# Patient Record
Sex: FEMALE | Race: WHITE | NOT HISPANIC OR LATINO | Employment: UNEMPLOYED | URBAN - METROPOLITAN AREA
[De-identification: names, ages, dates, MRNs, and addresses within clinical notes are randomized per-mention and may not be internally consistent; named-entity substitution may affect disease eponyms.]

---

## 2016-06-21 LAB
EXTERNAL ABO GROUPING: NORMAL
EXTERNAL ANTIBODY SCREEN: NEGATIVE
EXTERNAL CHLAMYDIA SCREEN: NEGATIVE
EXTERNAL GONORRHEA SCREEN: NEGATIVE
EXTERNAL HEMATOCRIT: 36 %
EXTERNAL HEMOGLOBIN: 11.1 G/DL
EXTERNAL HEPATITIS B SURFACE ANTIGEN: NEGATIVE
EXTERNAL HEPATITIS C AB: NEGATIVE
EXTERNAL RH FACTOR: POSITIVE
EXTERNAL RUBELLA QUALITATIVE: NORMAL
EXTERNAL URINE DRUG SCREEN: POSITIVE
EXTERNAL VDRL: NEGATIVE
HIV1 AB SPEC QL IA.RAPID: NEGATIVE

## 2017-01-17 ENCOUNTER — HOSPITAL ENCOUNTER (OUTPATIENT)
Facility: HOSPITAL | Age: 27
Discharge: HOME | End: 2017-01-17
Attending: OBSTETRICS & GYNECOLOGY | Admitting: OBSTETRICS & GYNECOLOGY

## 2017-01-17 VITALS
WEIGHT: 150 LBS | DIASTOLIC BLOOD PRESSURE: 63 MMHG | RESPIRATION RATE: 20 BRPM | SYSTOLIC BLOOD PRESSURE: 114 MMHG | HEIGHT: 64 IN | HEART RATE: 118 BPM | BODY MASS INDEX: 25.61 KG/M2 | TEMPERATURE: 98.8 F

## 2017-01-17 PROBLEM — Z34.90 PREGNANCY: Status: ACTIVE | Noted: 2017-01-17

## 2017-01-17 RX ORDER — PSEUDOEPHEDRINE HCL 30 MG
30 TABLET ORAL EVERY 4 HOURS PRN
COMMUNITY
End: 2017-01-21 | Stop reason: HOSPADM

## 2017-01-17 RX ORDER — ACETAMINOPHEN 500 MG
500 TABLET ORAL EVERY 6 HOURS PRN
COMMUNITY
End: 2017-01-21 | Stop reason: HOSPADM

## 2017-01-17 NOTE — IP AVS SNAPSHOT
AFTER VISIT SUMMARY             Caroline Sow           About your hospitalization     You were discharged on:  January 17, 2017 You last received care in the:  [facility]     Unit phone number:  [phone]       Medications    If you or your caregiver advised us that you are currently taking a medication and that medication is marked below as “Resume”, this simply indicates that we have reviewed those medications to make sure our new therapy recommendations do not interfere.  If you have concerns about medications other than those new ones which we are prescribing today, please consult the physician who prescribed them (or your primary physician).  Our review of your home medications is not meant to indicate that we are directing their use.             Your Medications      ASK your doctor about these medications           Morning Noon Evening Bedtime As Needed    acetaminophen 500 MG tablet   Take 500 mg by mouth Every 6 (Six) Hours As Needed for mild pain (1-3).   Commonly known as:  TYLENOL                                pseudoephedrine 30 MG tablet   Take 30 mg by mouth Every 4 (Four) Hours As Needed for congestion.   Commonly known as:  SUDAFED                                         Instructions for After Discharge         Relevant Prenatal Information          Facts About Your Prenatal Visit (All Dating Information Is Approximate)     Due Date How Far Along Am I?          2/1/2017 37 weeks 6 days        Vitals     Blood Pressure Weight                114/63 150 lb (68 kg)           Summary of Your Hospitalization        Reason for Hospitalization     Your primary diagnosis was:  Not on File      Care Providers     Provider Service Role Specialty    Kevin Holloway MD -- Attending Provider Obstetrics and Gynecology      Your Allergies  Date Reviewed: 1/17/2017    Allergen Reactions    Penicillins Rash      MyChart Signup     [facility] MyChart allows you to send  messages to your doctor, view your test results, renew your prescriptions, schedule appointments, and more. To sign up, go to [email or web address] and click on the Sign Up Now link in the New User? box. Enter your [employer] Activation Code exactly as it appears below along with the last four digits of your Social Security Number and your Date of Birth () to complete the sign-up process. If you do not sign up before the expiration date, you must request a new code.    Z[employer] Activation Code: [email or web address]  Expires: 2017  7:22 PM    If you have questions, you can email [email or web address] or call [phone] to talk to our [employer] staff. Remember, [employer] is NOT to be used for urgent needs. For medical emergencies, dial 911.          Information Regarding Fetal Kick Counts     Fetal Movement Counts  Patient Name: __________________________________________________   Patient Due Date: ____________________  Performing a fetal movement count is highly recommended in high-risk pregnancies, but it is good for every pregnant woman to do. Your caregiver may ask you to start counting fetal movements at 28 weeks of the pregnancy. Fetal movements often increase:  · After eating a full meal.  · After physical activity.  · After eating or drinking something sweet or cold.  · At rest.  Pay attention to when you feel the baby is most active. This will help you notice a pattern of your baby's sleep and wake cycles and what factors contribute to an increase in fetal movement. It is important to perform a fetal movement count at the same time each day when your baby is normally most active.    HOW TO COUNT FETAL MOVEMENTS  1. Find a quiet and comfortable area to sit or lie down on your left side. Lying on your left side provides the best blood and oxygen circulation to your baby.  2. Write down the day and time on a sheet of paper or in a journal.  3. Start counting kicks, flutters, swishes, rolls, or jabs in  a 2 hour period. You should feel at least 10 movements within 2 hours.  4. If you do not feel 10 movements in 2 hours, wait 2-3 hours and count again. Look for a change in the pattern or not enough counts in 2 hours.  SEEK MEDICAL CARE IF:  · You feel less than 10 counts in 2 hours, tried twice.  · There is no movement in over an hour.  · The pattern is changing or taking longer each day to reach 10 counts in 2 hours.  · You feel the baby is not moving as he or she usually does.    Date  Movements  Start Time  Finish Time    Date  Movements  Start Time  Finish Time    Date  Movements  Start Time  Finish Time    Date  Movements  Start Time  Finish Time    Date  Movements  Start Time  Finish Time    Date  Movements  Start Time  Finish Time      Date  Movements  Start Time  Finish Time    Date  Movements  Start Time  Finish Time    Date  Movements  Start Time  Finish Time    Date  Movements  Start Time  Finish Time    Date  Movements  Start Time  Finish Time    Date  Movements  Start Time  Finish Time      Date  Movements  Start Time  Finish Time    Date  Movements  Start Time  Finish Time    Date  Movements  Start Time  Finish Time    Date  Movements  Start Time  Finish Time    Date  Movements  Start Time  Finish Time    Date  Movements  Start Time  Finish Time    Date  Movements  Start Time  Finish Time    Date  Movements  Start Time  Finish Time    Date  Movements  Start Time  Finish Time    Date  Movements  Start Time  Finish Time    Date  Movements  Start Time  Finish Time    Date  Movements  Start Time  Finish Time      Document Released: 01/17/2008   Document Revised: 12/04/2013   Document Reviewed: 10/14/2013    ExitCare® Patient Information ©2015 [place], [facility], [place]. This information is not intended to replace advice given to you by your health care provider. Make sure you discuss any questions you have with your health care provider.            More Information      Pradeep Butler Contractions  Contractions of the  uterus can occur throughout pregnancy. Contractions are not always a sign that you are in labor.   WHAT ARE NATALIE BUTLER CONTRACTIONS?   Contractions that occur before labor are called [place] Butler contractions, or false labor. Toward the end of pregnancy (32-34 weeks), these contractions can develop more often and may become more forceful. This is not true labor because these contractions do not result in opening (dilatation) and thinning of the cervix. They are sometimes difficult to tell apart from true labor because these contractions can be forceful and people have different pain tolerances. You should not feel embarrassed if you go to the hospital with false labor. Sometimes, the only way to tell if you are in true labor is for your health care provider to look for changes in the cervix.  If there are no prenatal problems or other health problems associated with the pregnancy, it is completely safe to be sent home with false labor and await the onset of true labor.  HOW CAN YOU TELL THE DIFFERENCE BETWEEN TRUE AND FALSE LABOR?  False Labor  · The contractions of false labor are usually shorter and not as hard as those of true labor.    · The contractions are usually irregular.    · The contractions are often felt in the front of the lower abdomen and in the groin.    · The contractions may go away when you walk around or change positions while lying down.    · The contractions get weaker and are shorter lasting as time goes on.    · The contractions do not usually become progressively stronger, regular, and closer together as with true labor.    True Labor  · Contractions in true labor last 30-70 seconds, become very regular, usually become more intense, and increase in frequency.    · The contractions do not go away with walking.    · The discomfort is usually felt in the top of the uterus and spreads to the lower abdomen and low back.    · True labor can be determined by your health care provider with an  exam. This will show that the cervix is dilating and getting thinner.    WHAT TO REMEMBER  · Keep up with your usual exercises and follow other instructions given by your health care provider.    · Take medicines as directed by your health care provider.    · Keep your regular prenatal appointments.    · Eat and drink lightly if you think you are going into labor.    · If [place] Butler contractions are making you uncomfortable:      Change your position from lying down or resting to walking, or from walking to resting.      Sit and rest in a tub of warm water.      Drink 2-3 glasses of water. Dehydration may cause these contractions.      Do slow and deep breathing several times an hour.    WHEN SHOULD I SEEK IMMEDIATE MEDICAL CARE?  Seek immediate medical care if:  · Your contractions become stronger, more regular, and closer together.    · You have fluid leaking or gushing from your vagina.    · You have a fever.    · You pass blood-tinged mucus.    · You have vaginal bleeding.    · You have continuous abdominal pain.    · You have low back pain that you never had before.    · You feel your baby's head pushing down and causing pelvic pressure.    · Your baby is not moving as much as it used to.       This information is not intended to replace advice given to you by your health care provider. Make sure you discuss any questions you have with your health care provider.     Document Released: 12/18/2006 Document Revised: 12/23/2014 Document Reviewed: 09/29/2014  [employer] Interactive Patient Education ©2016 [employer]            SYMPTOMS OF A STROKE    Call 911 or have someone take you to the Emergency Department if you have any of the following:    · Sudden numbness or weakness of your face, arm or leg especially on one side of the body  · Sudden confusion, diffiiculty speaking or trouble understanding   · Changes in your vision or loss of sight in one eye  · Sudden severe headache with no known cause  · sudden  dizziness, trouble walking, loss of balance or coordination    It is important to seek emergency care right away if you have further stroke symptoms. If you get emergency help quickly, the powerful clot-dissolving medicines can reduce the disabilities caused by a stroke.     For more information:    American Stroke Association  9-478-9-STROKE  www.strokeassociation.org           IF YOU SMOKE OR USE TOBACCO PLEASE READ THE FOLLOWING:    Why is smoking bad for me?  Smoking increases the risk of heart disease, lung disease, vascular disease, stroke, and cancer.     If you smoke, STOP!    If you would like more information on quitting smoking, please visit the [employer] website: [email or web address]   This link will provide additional resources including the QUIT line and the [employer] support groups.     For more information:    American Cancer Society  (328) 293-4365    American Heart Association  1-603.391.2729               YOU ARE THE MOST IMPORTANT FACTOR IN YOUR RECOVERY.     Follow all instructions carefully.     I have reviewed my discharge instructions with my nurse, including the following information, if applicable:     Information about my illness and diagnosis   Follow up appointments (including lab draws)   Wound Care   Equipment Needs   Medications (new and continuing) along with side effects   Preventative information such as vaccines and smoking cessations   Diet   Pain   I know when to contact my Doctor's office or seek emergency care      I want my nurse to describe the side effects of my medications: YES NO   If the answer is no, I understand the side effects of my medications: YES NO   My nurse described the side effects of my medications in a way that I could understand: YES NO   I have taken my personal belongings and my own medications with me at discharge: YES NO            Should a home visit be schedule with you:  a notification from your  provider will be made prior to the visit.  If you have any questions or concerns about the visit, contact your provider.      I have received this information and my questions have been answered. I have discussed any concerns I see with this plan with the nurse or physician. I understand these instructions.    Signature of Patient or Responsible Person: _____________________________________    Date: _________________  Time: __________________    Signature of Healthcare Provider: _______________________________________  Date: _________________  Time: __________________

## 2017-01-19 ENCOUNTER — ANESTHESIA EVENT (OUTPATIENT)
Dept: OBSTETRICS AND GYNECOLOGY | Facility: HOSPITAL | Age: 27
End: 2017-01-19

## 2017-01-19 ENCOUNTER — ANESTHESIA (OUTPATIENT)
Dept: OBSTETRICS AND GYNECOLOGY | Facility: HOSPITAL | Age: 27
End: 2017-01-19

## 2017-01-19 ENCOUNTER — HOSPITAL ENCOUNTER (INPATIENT)
Facility: HOSPITAL | Age: 27
LOS: 2 days | Discharge: HOME | End: 2017-01-21
Attending: OBSTETRICS & GYNECOLOGY | Admitting: OBSTETRICS & GYNECOLOGY

## 2017-01-19 DIAGNOSIS — Z30.2 REQUEST FOR STERILIZATION: Primary | ICD-10-CM

## 2017-01-19 PROBLEM — Z37.9 NORMAL LABOR: Status: ACTIVE | Noted: 2017-01-19

## 2017-01-19 LAB
ABO GROUP BLD: NORMAL
AMPHET+METHAMPHET UR QL: NEGATIVE
AMPHETAMINES UR QL: NEGATIVE
BARBITURATES UR QL SCN: NEGATIVE
BENZODIAZ UR QL SCN: NEGATIVE
BLD GP AB SCN SERPL QL: NEGATIVE
BUPRENORPHINE SERPL-MCNC: NEGATIVE NG/ML
CANNABINOIDS SERPL QL: NEGATIVE
COCAINE UR QL: NEGATIVE
DEPRECATED RDW RBC AUTO: 42.1 FL (ref 37–54)
ERYTHROCYTE [DISTWIDTH] IN BLOOD BY AUTOMATED COUNT: 13.3 % (ref 11.5–14.5)
HCT VFR BLD AUTO: 33.3 % (ref 37–47)
HGB BLD-MCNC: 10.5 G/DL (ref 12–16)
MCH RBC QN AUTO: 27.4 PG (ref 27–31)
MCHC RBC AUTO-ENTMCNC: 31.5 G/DL (ref 31–37)
MCV RBC AUTO: 86.9 FL (ref 81–99)
METHADONE UR QL SCN: NEGATIVE
OPIATES UR QL: NEGATIVE
OXYCODONE UR QL SCN: NEGATIVE
PCP UR QL SCN: NEGATIVE
PLATELET # BLD AUTO: 248 10*3/MM3 (ref 140–500)
PMV BLD AUTO: 11.2 FL (ref 7.4–10.4)
PROPOXYPH UR QL: NEGATIVE
RBC # BLD AUTO: 3.83 10*6/MM3 (ref 4.2–5.4)
RH BLD: POSITIVE
TRICYCLICS UR QL SCN: NEGATIVE
WBC NRBC COR # BLD: 15.02 10*3/MM3 (ref 4.8–10.8)

## 2017-01-19 PROCEDURE — 25010000002 FENTANYL CITRATE (PF) 100 MCG/2ML SOLUTION: Performed by: OBSTETRICS & GYNECOLOGY

## 2017-01-19 PROCEDURE — 86900 BLOOD TYPING SEROLOGIC ABO: CPT

## 2017-01-19 PROCEDURE — 25010000002 FENTANYL CITRATE (PF) 100 MCG/2ML SOLUTION

## 2017-01-19 PROCEDURE — 80306 DRUG TEST PRSMV INSTRMNT: CPT | Performed by: OBSTETRICS & GYNECOLOGY

## 2017-01-19 PROCEDURE — 86901 BLOOD TYPING SEROLOGIC RH(D): CPT

## 2017-01-19 PROCEDURE — 86850 RBC ANTIBODY SCREEN: CPT

## 2017-01-19 PROCEDURE — 85027 COMPLETE CBC AUTOMATED: CPT | Performed by: OBSTETRICS & GYNECOLOGY

## 2017-01-19 RX ORDER — FENTANYL CITRATE 50 UG/ML
100 INJECTION, SOLUTION INTRAMUSCULAR; INTRAVENOUS
Status: DISCONTINUED | OUTPATIENT
Start: 2017-01-19 | End: 2017-01-20 | Stop reason: ALTCHOICE

## 2017-01-19 RX ORDER — CARBOPROST TROMETHAMINE 250 UG/ML
250 INJECTION, SOLUTION INTRAMUSCULAR ONCE
Status: DISCONTINUED | OUTPATIENT
Start: 2017-01-19 | End: 2017-01-20 | Stop reason: ALTCHOICE

## 2017-01-19 RX ORDER — ONDANSETRON 2 MG/ML
4 INJECTION INTRAMUSCULAR; INTRAVENOUS ONCE AS NEEDED
Status: DISCONTINUED | OUTPATIENT
Start: 2017-01-19 | End: 2017-01-20

## 2017-01-19 RX ORDER — PRENATAL VIT/IRON FUM/FOLIC AC 27MG-0.8MG
1 TABLET ORAL DAILY
Status: DISCONTINUED | OUTPATIENT
Start: 2017-01-19 | End: 2017-01-21 | Stop reason: HOSPADM

## 2017-01-19 RX ORDER — BISACODYL 10 MG
10 SUPPOSITORY, RECTAL RECTAL DAILY PRN
Status: DISCONTINUED | OUTPATIENT
Start: 2017-01-20 | End: 2017-01-21 | Stop reason: HOSPADM

## 2017-01-19 RX ORDER — METHYLERGONOVINE MALEATE 0.2 MG/ML
200 INJECTION INTRAVENOUS ONCE AS NEEDED
Status: DISCONTINUED | OUTPATIENT
Start: 2017-01-19 | End: 2017-01-20 | Stop reason: ALTCHOICE

## 2017-01-19 RX ORDER — SUFENTANIL CITRATE 50 UG/ML
INJECTION EPIDURAL; INTRAVENOUS
Status: DISCONTINUED
Start: 2017-01-19 | End: 2017-01-19 | Stop reason: WASHOUT

## 2017-01-19 RX ORDER — SODIUM CHLORIDE, SODIUM LACTATE, POTASSIUM CHLORIDE, CALCIUM CHLORIDE 600; 310; 30; 20 MG/100ML; MG/100ML; MG/100ML; MG/100ML
125 INJECTION, SOLUTION INTRAVENOUS CONTINUOUS
Status: DISCONTINUED | OUTPATIENT
Start: 2017-01-19 | End: 2017-01-20 | Stop reason: ALTCHOICE

## 2017-01-19 RX ORDER — OXYTOCIN 10 [USP'U]/ML
INJECTION, SOLUTION INTRAMUSCULAR; INTRAVENOUS
Status: COMPLETED
Start: 2017-01-19 | End: 2017-01-19

## 2017-01-19 RX ORDER — MISOPROSTOL 100 UG/1
600 TABLET ORAL ONCE
Status: DISCONTINUED | OUTPATIENT
Start: 2017-01-19 | End: 2017-01-20 | Stop reason: ALTCHOICE

## 2017-01-19 RX ORDER — FENTANYL CITRATE 50 UG/ML
INJECTION, SOLUTION INTRAMUSCULAR; INTRAVENOUS
Status: COMPLETED
Start: 2017-01-19 | End: 2017-01-19

## 2017-01-19 RX ORDER — SODIUM CHLORIDE 0.9 % (FLUSH) 0.9 %
1-10 SYRINGE (ML) INJECTION AS NEEDED
Status: DISCONTINUED | OUTPATIENT
Start: 2017-01-19 | End: 2017-01-20 | Stop reason: ALTCHOICE

## 2017-01-19 RX ORDER — HYDROCODONE BITARTRATE AND ACETAMINOPHEN 5; 325 MG/1; MG/1
1 TABLET ORAL EVERY 4 HOURS PRN
Status: DISCONTINUED | OUTPATIENT
Start: 2017-01-19 | End: 2017-01-21 | Stop reason: HOSPADM

## 2017-01-19 RX ORDER — BUPIVACAINE HYDROCHLORIDE 5 MG/ML
INJECTION, SOLUTION EPIDURAL; INTRACAUDAL AS NEEDED
Status: DISCONTINUED | OUTPATIENT
Start: 2017-01-19 | End: 2017-03-14 | Stop reason: SURG

## 2017-01-19 RX ORDER — FAMOTIDINE 10 MG/ML
20 INJECTION, SOLUTION INTRAVENOUS ONCE AS NEEDED
Status: DISCONTINUED | OUTPATIENT
Start: 2017-01-19 | End: 2017-01-20 | Stop reason: ALTCHOICE

## 2017-01-19 RX ORDER — SODIUM CHLORIDE 0.9 % (FLUSH) 0.9 %
1-10 SYRINGE (ML) INJECTION AS NEEDED
Status: CANCELLED | OUTPATIENT
Start: 2017-01-19

## 2017-01-19 RX ORDER — IBUPROFEN 800 MG/1
800 TABLET ORAL EVERY 8 HOURS PRN
Status: DISCONTINUED | OUTPATIENT
Start: 2017-01-19 | End: 2017-01-21 | Stop reason: HOSPADM

## 2017-01-19 RX ORDER — LANOLIN 100 %
OINTMENT (GRAM) TOPICAL AS NEEDED
Status: DISCONTINUED | OUTPATIENT
Start: 2017-01-19 | End: 2017-01-21 | Stop reason: HOSPADM

## 2017-01-19 RX ORDER — DOCUSATE SODIUM 100 MG/1
100 CAPSULE, LIQUID FILLED ORAL 2 TIMES DAILY
Status: DISCONTINUED | OUTPATIENT
Start: 2017-01-19 | End: 2017-01-21 | Stop reason: HOSPADM

## 2017-01-19 RX ORDER — ZOLPIDEM TARTRATE 5 MG/1
5 TABLET ORAL NIGHTLY PRN
Status: DISCONTINUED | OUTPATIENT
Start: 2017-01-19 | End: 2017-01-21 | Stop reason: HOSPADM

## 2017-01-19 RX ADMIN — BUPIVACAINE HYDROCHLORIDE 1 ML: 5 INJECTION, SOLUTION EPIDURAL; INTRACAUDAL; PERINEURAL at 06:40

## 2017-01-19 RX ADMIN — PRENATAL VIT W/ FE FUMARATE-FA TAB 27-0.8 MG 1 TABLET: 27-0.8 TAB at 11:03

## 2017-01-19 RX ADMIN — BUPIVACAINE HYDROCHLORIDE 1 ML: 5 INJECTION, SOLUTION EPIDURAL; INTRACAUDAL; PERINEURAL at 06:49

## 2017-01-19 RX ADMIN — OXYTOCIN: 10 INJECTION, SOLUTION INTRAMUSCULAR; INTRAVENOUS at 08:30

## 2017-01-19 RX ADMIN — HYDROCODONE BITARTATE AND ACETAMINOPHEN 1 TABLET: 5; 325 TABLET ORAL at 21:52

## 2017-01-19 RX ADMIN — FENTANYL CITRATE 25 MCG: 50 INJECTION, SOLUTION INTRAMUSCULAR; INTRAVENOUS at 06:40

## 2017-01-19 RX ADMIN — DOCUSATE SODIUM 100 MG: 100 CAPSULE, LIQUID FILLED ORAL at 11:03

## 2017-01-19 RX ADMIN — FENTANYL CITRATE 100 MCG: 50 INJECTION, SOLUTION INTRAMUSCULAR; INTRAVENOUS at 05:27

## 2017-01-19 RX ADMIN — IBUPROFEN 800 MG: 800 TABLET ORAL at 20:42

## 2017-01-19 RX ADMIN — HYDROCODONE BITARTATE AND ACETAMINOPHEN 1 TABLET: 5; 325 TABLET ORAL at 11:03

## 2017-01-19 RX ADMIN — DOCUSATE SODIUM 100 MG: 100 CAPSULE, LIQUID FILLED ORAL at 17:36

## 2017-01-19 RX ADMIN — Medication: at 14:43

## 2017-01-19 RX ADMIN — OXYTOCIN 20 UNITS: 10 INJECTION, SOLUTION INTRAMUSCULAR; INTRAVENOUS at 08:30

## 2017-01-19 RX ADMIN — HYDROCODONE BITARTATE AND ACETAMINOPHEN 1 TABLET: 5; 325 TABLET ORAL at 14:10

## 2017-01-19 RX ADMIN — SODIUM CHLORIDE, POTASSIUM CHLORIDE, SODIUM LACTATE AND CALCIUM CHLORIDE 999 ML/HR: 600; 310; 30; 20 INJECTION, SOLUTION INTRAVENOUS at 05:30

## 2017-01-19 RX ADMIN — HYDROCODONE BITARTATE AND ACETAMINOPHEN 1 TABLET: 5; 325 TABLET ORAL at 17:40

## 2017-01-19 RX ADMIN — IBUPROFEN 800 MG: 800 TABLET ORAL at 11:03

## 2017-01-19 RX ADMIN — BUPIVACAINE HYDROCHLORIDE 1 ML: 5 INJECTION, SOLUTION EPIDURAL; INTRACAUDAL; PERINEURAL at 06:45

## 2017-01-19 NOTE — NURSING NOTE
Patient ambulated to the bathroom.  New gown, pad, and undies applied. Patient prepared for postpartum care.  Patient taken to [address]8 via wheel chair.

## 2017-01-19 NOTE — IP AVS SNAPSHOT
AFTER VISIT SUMMARY             Caroline Sow           About your hospitalization     You were discharged on:  January 21, 2017 You last received care in the:  [facility]     Unit phone number:  [phone]       Medications    If you or your caregiver advised us that you are currently taking a medication and that medication is marked below as “Resume”, this simply indicates that we have reviewed those medications to make sure our new therapy recommendations do not interfere.  If you have concerns about medications other than those new ones which we are prescribing today, please consult the physician who prescribed them (or your primary physician).  Our review of your home medications is not meant to indicate that we are directing their use.             Your Medications      Your Medication List           Morning Noon Evening Bedtime As Needed     MG capsule   Take 100 mg by mouth 2 (Two) Times a Day.                                ferrous sulfate 324 (65 FE) MG tablet delayed-release EC tablet   Take 1 tablet by mouth Daily With Breakfast.                                HYDROcodone-acetaminophen 5-325 MG per tablet   Take 2 tablets by mouth Every 4 (Four) Hours As Needed for severe pain (7-10) for up to 8 days.   Commonly known as:  NORCO                                ibuprofen 800 MG tablet   Take 1 tablet by mouth Every 8 (Eight) Hours As Needed for mild pain (1-3).   Commonly known as:  ADVIL,MOTRIN                                prenatal vitamin 27-0.8 27-0.8 MG tablet tablet   Take 1 tablet by mouth Daily.                                         Instructions for After Discharge        Activity Instructions     Activity as Tolerated           Pelvic Rest                 Diet Instructions     Diet: Regular; Thin Liquids, No Restrictions       Discharge Diet:  Regular   Fluid Consistency:  Thin Liquids, No Restrictions              Follow-ups for After Discharge         "  Referrals and Follow-ups to Schedule     Call MD With Problems / Concerns    As directed    Instructions: No driving for 2 wks, call for temp>101, heavy vaginal bleeding or increasing lower abdominal pain.  Cont prenatal vits for 6 wks or as long as pt breastfeeds.  No [place] or tampons for 6 weeks.  Bath or shower.  Call for any problems with postpartum depression.       Follow-Up    As directed    6 weeks for postpartum exam.  Call to make apt and remind the office that you want your tubes tied.             [employer] Signup Information     [employer] allows you to send messages to your doctor, view your test results, renew your prescriptions, schedule appointments, and more. To sign up, go to [employer] and click on the Sign Up Now link in the New User? box. Enter your [employer] Activation Code exactly as it appears below along with the last four digits of your Social Security Number and your Date of Birth () to complete the sign-up process. If you do not sign up before the expiration date, you must request a new code.    [employer] Activation Code: [email or web address]  Expires: 2017  7:22 PM    If you have questions, you can email [email or web address] or call [phone] to talk to our [employer] staff. Remember, [employer] is NOT to be used for urgent needs. For medical emergencies, dial 911.           Summary of Your Hospitalization        Reason for Hospitalization     Your primary diagnosis was:  Not on File    Your diagnoses also included:  Normal Labor      Care Providers     Provider Service Role Specialty    Kevin Holloway MD -- Attending Provider Obstetrics and Gynecology    Richard Sevilla MD -- Surgeon  Obstetrics and Gynecology      Your Allergies  Date Reviewed: 2017    Allergen Reactions    Penicillins Rash        More Information      Influenza (Flu) Vaccine (Inactivated or Recombinant):   1. Why get vaccinated?  Influenza (\"flu\") is a " contagious disease that spreads around the [place] every year, usually between October and May.  Flu is caused by influenza viruses, and is spread mainly by coughing, sneezing, and close contact.  Anyone can get flu. Flu strikes suddenly and can last several days. Symptoms vary by age, but can include:  · fever/chills  · sore throat  · muscle aches  · fatigue  · cough  · headache  · runny or stuffy nose  Flu can also lead to pneumonia and blood infections, and cause diarrhea and seizures in children. If you have a medical condition, such as heart or lung disease, flu can make it worse.  Flu is more dangerous for some people. Infants and young children, people 65 years of age and older, pregnant women, and people with certain health conditions or a weakened immune system are at greatest risk.  Each year thousands of people in the [place] die from flu, and many more are hospitalized.  Flu vaccine can:  · keep you from getting flu,  · make flu less severe if you do get it, and  · keep you from spreading flu to your family and other people.  2. Inactivated and recombinant flu vaccines  A dose of flu vaccine is recommended every flu season. Children 6 months through 8 years of age may need two doses during the same flu season. Everyone else needs only one dose each flu season.  Some inactivated flu vaccines contain a very small amount of a mercury-based preservative called thimerosal. Studies have not shown thimerosal in vaccines to be harmful, but flu vaccines that do not contain thimerosal are available.  There is no live flu virus in flu shots. They cannot cause the flu.  There are many flu viruses, and they are always changing. Each year a new flu vaccine is made to protect against three or four viruses that are likely to cause disease in the upcoming flu season. But even when the vaccine doesn't exactly match these viruses, it may still provide some protection.  Flu vaccine cannot prevent:  · flu that is  caused by a virus not covered by the vaccine, or  · illnesses that look like flu but are not.  It takes about 2 weeks for protection to develop after vaccination, and protection lasts through the flu season.  3. Some people should not get this vaccine  Tell the person who is giving you the vaccine:  · If you have any severe, life-threatening allergies. If you ever had a life-threatening allergic reaction after a dose of flu vaccine, or have a severe allergy to any part of this vaccine, you may be advised not to get vaccinated. Most, but not all, types of flu vaccine contain a small amount of egg protein.  · If you ever had Guillain-[place] Syndrome (also called GBS). Some people with a history of GBS should not get this vaccine. This should be discussed with your doctor.  · If you are not feeling well. It is usually okay to get flu vaccine when you have a mild illness, but you might be asked to come back when you feel better.  4. Risks of a vaccine reaction  With any medicine, including vaccines, there is a chance of reactions. These are usually mild and go away on their own, but serious reactions are also possible.  Most people who get a flu shot do not have any problems with it.  Minor problems following a flu shot include:  · soreness, redness, or swelling where the shot was given  · hoarseness  · sore, red or itchy eyes  · cough  · fever  · aches  · headache  · itching  · fatigue  If these problems occur, they usually begin soon after the shot and last 1 or 2 days.  More serious problems following a flu shot can include the following:  · There may be a small increased risk of Guillain-[place] Syndrome (GBS) after inactivated flu vaccine. This risk has been estimated at 1 or 2 additional cases per million people vaccinated. This is much lower than the risk of severe complications from flu, which can be prevented by flu vaccine.  · Young children who get the flu shot along with pneumococcal vaccine (PCV13) and/or DTaP  vaccine at the same time might be slightly more likely to have a seizure caused by fever. Ask your doctor for more information. Tell your doctor if a child who is getting flu vaccine has ever had a seizure.  Problems that could happen after any injected vaccine:  · People sometimes faint after a medical procedure, including vaccination. Sitting or lying down for about 15 minutes can help prevent fainting, and injuries caused by a fall. Tell your doctor if you feel dizzy, or have vision changes or ringing in the ears.  · Some people get severe pain in the shoulder and have difficulty moving the arm where a shot was given. This happens very rarely.  · Any medication can cause a severe allergic reaction. Such reactions from a vaccine are very rare, estimated at about 1 in a million doses, and would happen within a few minutes to a few hours after the vaccination.  As with any medicine, there is a very remote chance of a vaccine causing a serious injury or death.  The safety of vaccines is always being monitored. For more information, visit: www.cdc.gov/vaccinesafety/  5. What if there is a serious reaction?  What should I look for?  · Look for anything that concerns you, such as signs of a severe allergic reaction, very high fever, or unusual behavior.  Signs of a severe allergic reaction can include hives, swelling of the face and throat, difficulty breathing, a fast heartbeat, dizziness, and weakness. These would start a few minutes to a few hours after the vaccination.  What should I do?  · If you think it is a severe allergic reaction or other emergency that can't wait, call 9-1-1 and get the person to the nearest hospital. Otherwise, call your doctor.  · Reactions should be reported to the Vaccine Adverse Event Reporting System (VAERS). Your doctor should file this report, or you can do it yourself through the VAERS web site at www.vaers.hhs.gov, or by calling 1-573.248.5498.  [employer] does not give medical  advice.  6. The National Vaccine Injury Compensation Program  The National Vaccine Injury Compensation Program (VICP) is a federal program that was created to compensate people who may have been injured by certain vaccines.  Persons who believe they may have been injured by a vaccine can learn about the program and about filing a claim by calling 1-612.514.3967 or visiting the VICP website at www.[facility].gov/vaccinecompensation. There is a time limit to file a claim for compensation.  7. How can I learn more?  · Ask your healthcare provider. He or she can give you the vaccine package insert or suggest other sources of information.  · Call your local or state health department.  · Contact the Centers for Disease Control and Prevention (CDC):    Call 1-205.683.6441 (2-908-TQS-INFO) or    Visit CDC's website at www.cdc.gov/flu  Vaccine Information Statement Inactivated Influenza Vaccine (2015)     This information is not intended to replace advice given to you by your health care provider. Make sure you discuss any questions you have with your health care provider.     Document Released: 10/12/2007 Document Revised: 2016 Document Reviewed: 08/10/2015  [employer] Interactive Patient Education © [employer]          Tdap Vaccine (Tetanus, Diphtheria and Pertussis): What You Need to Know  1. Why get vaccinated?  Tetanus, diphtheria and pertussis are very serious diseases. Tdap vaccine can protect us from these diseases. And, Tdap vaccine given to pregnant women can protect  babies against pertussis.  TETANUS (Lockjaw) is rare in the [place] today. It causes painful muscle tightening and stiffness, usually all over the body.  · It can lead to tightening of muscles in the head and neck so you can't open your mouth, swallow, or sometimes even breathe. Tetanus kills about 1 out of 10 people who are infected even after receiving the best medical care.  DIPHTHERIA is also rare in the [place]  today. It can cause a thick coating to form in the back of the throat.  · It can lead to breathing problems, heart failure, paralysis, and death.  PERTUSSIS (Whooping Cough) causes severe coughing spells, which can cause difficulty breathing, vomiting and disturbed sleep.  · It can also lead to weight loss, incontinence, and rib fractures. Up to 2 in 100 adolescents and 5 in 100 adults with pertussis are hospitalized or have complications, which could include pneumonia or death.  These diseases are caused by bacteria. Diphtheria and pertussis are spread from person to person through secretions from coughing or sneezing. Tetanus enters the body through cuts, scratches, or wounds.  Before vaccines, as many as 200,000 cases of diphtheria, 200,000 cases of pertussis, and hundreds of cases of tetanus, were reported in the [place] each year. Since vaccination began, reports of cases for tetanus and diphtheria have dropped by about 99% and for pertussis by about 80%.  2. Tdap vaccine  Tdap vaccine can protect adolescents and adults from tetanus, diphtheria, and pertussis. One dose of Tdap is routinely given at age 11 or 12. People who did not get Tdap at that age should get it as soon as possible.  Tdap is especially important for healthcare professionals and anyone having close contact with a baby younger than 12 months.  Pregnant women should get a dose of Tdap during every pregnancy, to protect the  from pertussis. Infants are most at risk for severe, life-threatening complications from pertussis.  Another vaccine, called Td, protects against tetanus and diphtheria, but not pertussis. A Td booster should be given every 10 years. Tdap may be given as one of these boosters if you have never gotten Tdap before. Tdap may also be given after a severe cut or burn to prevent tetanus infection.  Your doctor or the person giving you the vaccine can give you more information.  Tdap may safely be given at the same  time as other vaccines.  3. Some people should not get this vaccine  · A person who has ever had a life-threatening allergic reaction after a previous dose of any diphtheria, tetanus or pertussis containing vaccine, OR has a severe allergy to any part of this vaccine, should not get Tdap vaccine. Tell the person giving the vaccine about any severe allergies.  · Anyone who had coma or long repeated seizures within 7 days after a childhood dose of DTP or DTaP, or a previous dose of Tdap, should not get Tdap, unless a cause other than the vaccine was found. They can still get Td.  · Talk to your doctor if you:    have seizures or another nervous system problem,    had severe pain or swelling after any vaccine containing diphtheria, tetanus or pertussis,    ever had a condition called Guillain-[place]arré Syndrome (GBS),    aren't feeling well on the day the shot is scheduled.  4. Risks  With any medicine, including vaccines, there is a chance of side effects. These are usually mild and go away on their own. Serious reactions are also possible but are rare.  Most people who get Tdap vaccine do not have any problems with it.  Mild problems following Tdap  (Did not interfere with activities)  · Pain where the shot was given (about 3 in 4 adolescents or 2 in 3 adults)  · Redness or swelling where the shot was given (about 1 person in 5)  · Mild fever of at least 100.4°F (up to about 1 in 25 adolescents or 1 in 100 adults)  · Headache (about 3 or 4 people in 10)  · Tiredness (about 1 person in 3 or 4)  · Nausea, vomiting, diarrhea, stomach ache (up to 1 in 4 adolescents or 1 in 10 adults)  · Chills, sore joints (about 1 person in 10)  · Body aches (about 1 person in 3 or 4)  · Rash, swollen glands (uncommon)  Moderate problems following Tdap  (Interfered with activities, but did not require medical attention)  · Pain where the shot was given (up to 1 in 5 or 6)  · Redness or swelling where the shot was given (up to about 1 in 16  adolescents or 1 in 12 adults)  · Fever over 102°F (about 1 in 100 adolescents or 1 in 250 adults)  · Headache (about 1 in 7 adolescents or 1 in 10 adults)  · Nausea, vomiting, diarrhea, stomach ache (up to 1 or 3 people in 100)  · Swelling of the entire arm where the shot was given (up to about 1 in 500).  Severe problems following Tdap  (Unable to perform usual activities; required medical attention)  · Swelling, severe pain, bleeding and redness in the arm where the shot was given (rare).  Problems that could happen after any vaccine:  · People sometimes faint after a medical procedure, including vaccination. Sitting or lying down for about 15 minutes can help prevent fainting, and injuries caused by a fall. Tell your doctor if you feel dizzy, or have vision changes or ringing in the ears.  · Some people get severe pain in the shoulder and have difficulty moving the arm where a shot was given. This happens very rarely.  · Any medication can cause a severe allergic reaction. Such reactions from a vaccine are very rare, estimated at fewer than 1 in a million doses, and would happen within a few minutes to a few hours after the vaccination.  As with any medicine, there is a very remote chance of a vaccine causing a serious injury or death.  The safety of vaccines is always being monitored. For more information, visit: www.cdc.gov/vaccinesafety/  5. What if there is a serious problem?  What should I look for?  · Look for anything that concerns you, such as signs of a severe allergic reaction, very high fever, or unusual behavior.  · Signs of a severe allergic reaction can include hives, swelling of the face and throat, difficulty breathing, a fast heartbeat, dizziness, and weakness. These would usually start a few minutes to a few hours after the vaccination.  What should I do?  · If you think it is a severe allergic reaction or other emergency that can't wait, call 9-1-1 or get the person to the nearest hospital.  Otherwise, call your doctor.  · Afterward, the reaction should be reported to the Vaccine Adverse Event Reporting System (VAERS). Your doctor might file this report, or you can do it yourself through the VAERS web site at www.vaers.hhs.gov, or by calling 1-709.384.9597.  VAERS does not give medical advice.   6. The National Vaccine Injury Compensation Program  The National Vaccine Injury Compensation Program (VICP) is a federal program that was created to compensate people who may have been injured by certain vaccines.  Persons who believe they may have been injured by a vaccine can learn about the program and about filing a claim by calling 1-845.894.8733 or visiting the VICP website at www.[facility].gov/vaccinecompensation. There is a time limit to file a claim for compensation.  7. How can I learn more?  · Ask your doctor. He or she can give you the vaccine package insert or suggest other sources of information.  · Call your local or state health department.  · Contact the Centers for Disease Control and Prevention (CDC):    Call 1-825.813.7435 (3-554-CEF-INFO) or    Visit CDC's website at www.cdc.gov/vaccines  CDC Tdap Vaccine VIS (2/24/15)     This information is not intended to replace advice given to you by your health care provider. Make sure you discuss any questions you have with your health care provider.     Document Released: 06/18/2013 Document Revised: 01/08/2016 Document Reviewed: 04/01/2015  [employer] Interactive Patient Education ©2016 [employer]          Pneumococcal Vaccine, Polyvalent solution for injection  What is this medicine?  PNEUMOCOCCAL VACCINE, POLYVALENT (JACK mo REYNA al vak SEEN, huseyin huber dunn) is a vaccine to prevent pneumococcus bacteria infection. These bacteria are a major cause of ear infections, Strep throat infections, and serious pneumonia, meningitis, or blood infections worldwide. These vaccines help the body to produce antibodies (protective substances) that help your body  defend against these bacteria. This vaccine is recommended for people 2 years of age and older with health problems. It is also recommended for all adults over 50 years old. This vaccine will not treat an infection.  This medicine may be used for other purposes; ask your health care provider or pharmacist if you have questions.  What should I tell my health care provider before I take this medicine?  They need to know if you have any of these conditions:  -bleeding problems  -bone marrow or organ transplant  -cancer, Hodgkin's disease  -fever  -infection  -immune system problems  -low platelet count in the blood  -seizures  -an unusual or allergic reaction to pneumococcal vaccine, diphtheria toxoid, other vaccines, latex, other medicines, foods, dyes, or preservatives  -pregnant or trying to get pregnant  -breast-feeding  How should I use this medicine?  This vaccine is for injection into a muscle or under the skin. It is given by a health care professional.  A copy of Vaccine Information Statements will be given before each vaccination. Read this sheet carefully each time. The sheet may change frequently.  Talk to your pediatrician regarding the use of this medicine in children. While this drug may be prescribed for children as young as 2 years of age for selected conditions, precautions do apply.  Overdosage: If you think you have taken too much of this medicine contact a poison control center or emergency room at once.  NOTE: This medicine is only for you. Do not share this medicine with others.  What if I miss a dose?  It is important not to miss your dose. Call your doctor or health care professional if you are unable to keep an appointment.  What may interact with this medicine?  -medicines for cancer chemotherapy  -medicines that suppress your immune function  -medicines that treat or prevent blood clots like warfarin, enoxaparin, and dalteparin  -steroid medicines like prednisone or cortisone  This list may  not describe all possible interactions. Give your health care provider a list of all the medicines, herbs, non-prescription drugs, or dietary supplements you use. Also tell them if you smoke, drink alcohol, or use illegal drugs. Some items may interact with your medicine.  What should I watch for while using this medicine?  Mild fever and pain should go away in 3 days or less. Report any unusual symptoms to your doctor or health care professional.  What side effects may I notice from receiving this medicine?  Side effects that you should report to your doctor or health care professional as soon as possible:  -allergic reactions like skin rash, itching or hives, swelling of the face, lips, or tongue  -breathing problems  -confused  -fever over 102 degrees F  -pain, tingling, numbness in the hands or feet  -seizures  -unusual bleeding or bruising  -unusual muscle weakness  Side effects that usually do not require medical attention (report to your doctor or health care professional if they continue or are bothersome):  -aches and pains  -diarrhea  -fever of 102 degrees F or less  -headache  -irritable  -loss of appetite  -pain, tender at site where injected  -trouble sleeping  This list may not describe all possible side effects. Call your doctor for medical advice about side effects. You may report side effects to FDA at 0-359-FDA-1242.  Where should I keep my medicine?  This does not apply. This vaccine is given in a clinic, pharmacy, doctor's office, or other health care setting and will not be stored at home.  NOTE: This sheet is a summary. It may not cover all possible information. If you have questions about this medicine, talk to your doctor, pharmacist, or health care provider.     © 2016, Elsevier/[employer]. (2009-07-24 14:32:37)          Vaginal Delivery, Care After  Refer to this sheet in the next few weeks. These discharge instructions provide you with information on caring for yourself after delivery.  Your health care provider may also give you specific instructions. Your treatment has been planned according to the most current medical practices available, but problems sometimes occur. Call your health care provider if you have any problems or questions after you go home.  HOME CARE INSTRUCTIONS  · Take over-the-counter or prescription medicines only as directed by your health care provider or pharmacist.  · Do not drink alcohol, especially if you are breastfeeding or taking medicine to relieve pain.  · Do not chew or smoke tobacco.  · Do not use illegal drugs.  · Continue to use good perineal care. Good perineal care includes:    Wiping your perineum from front to back.    Keeping your perineum clean.  · Do not use tampons or douche until your health care provider says it is okay.  · Shower, wash your hair, and take tub baths as directed by your health care provider.  · Wear a well-fitting bra that provides breast support.  · Eat healthy foods.  · Drink enough fluids to keep your urine clear or pale yellow.  · Eat high-fiber foods such as whole grain cereals and breads, brown rice, beans, and fresh fruits and vegetables every day. These foods may help prevent or relieve constipation.  · Follow your health care provider's recommendations regarding resumption of activities such as climbing stairs, driving, lifting, exercising, or traveling.  · Talk to your health care provider about resuming sexual activities. Resumption of sexual activities is dependent upon your risk of infection, your rate of healing, and your comfort and desire to resume sexual activity.  · Try to have someone help you with your household activities and your  for at least a few days after you leave the hospital.  · Rest as much as possible. Try to rest or take a nap when your  is sleeping.  · Increase your activities gradually.  · Keep all of your scheduled postpartum appointments. It is very important to keep your scheduled  follow-up appointments. At these appointments, your health care provider will be checking to make sure that you are healing physically and emotionally.  SEEK MEDICAL CARE IF:   · You are passing large clots from your vagina. Save any clots to show your health care provider.  · You have a foul smelling discharge from your vagina.  · You have trouble urinating.  · You are urinating frequently.  · You have pain when you urinate.  · You have a change in your bowel movements.  · You have increasing redness, pain, or swelling near your vaginal incision (episiotomy) or vaginal tear.  · You have pus draining from your episiotomy or vaginal tear.  · Your episiotomy or vaginal tear is .  · You have painful, hard, or reddened breasts.  · You have a severe headache.  · You have blurred vision or see spots.  · You feel sad or depressed.  · You have thoughts of hurting yourself or your .  · You have questions about your care, the care of your , or medicines.  · You are dizzy or light-headed.  · You have a rash.  · You have nausea or vomiting.  · You were breastfeeding and have not had a menstrual period within 12 weeks after you stopped breastfeeding.  · You are not breastfeeding and have not had a menstrual period by the 12th week after delivery.  · You have a fever.  SEEK IMMEDIATE MEDICAL CARE IF:   · You have persistent pain.  · You have chest pain.  · You have shortness of breath.  · You faint.  · You have leg pain.  · You have stomach pain.  · Your vaginal bleeding saturates two or more sanitary pads in 1 hour.     This information is not intended to replace advice given to you by your health care provider. Make sure you discuss any questions you have with your health care provider.     Document Released: 12/15/2001 Document Revised: 2016 Document Reviewed: 2013  [employer] Interactive Patient Education ©6 [employer]            SYMPTOMS OF A STROKE    Call 911 or have someone take you  to the Emergency Department if you have any of the following:    · Sudden numbness or weakness of your face, arm or leg especially on one side of the body  · Sudden confusion, diffiiculty speaking or trouble understanding   · Changes in your vision or loss of sight in one eye  · Sudden severe headache with no known cause  · sudden dizziness, trouble walking, loss of balance or coordination    It is important to seek emergency care right away if you have further stroke symptoms. If you get emergency help quickly, the powerful clot-dissolving medicines can reduce the disabilities caused by a stroke.     For more information:    American Stroke Association  7-737-5-STROKE  www.strokeassociation.org           IF YOU SMOKE OR USE TOBACCO PLEASE READ THE FOLLOWING:    Why is smoking bad for me?  Smoking increases the risk of heart disease, lung disease, vascular disease, stroke, and cancer.     If you smoke, STOP!    If you would like more information on quitting smoking, please visit the [employer] website: [email or web address]   This link will provide additional resources including the QUIT line and the Beat the Pack support groups.     For more information:    American Cancer Society  ([phone]    American Heart Association  1-668.305.4957               YOU ARE THE MOST IMPORTANT FACTOR IN YOUR RECOVERY.     Follow all instructions carefully.     I have reviewed my discharge instructions with my nurse, including the following information, if applicable:     Information about my illness and diagnosis   Follow up appointments (including lab draws)   Wound Care   Equipment Needs   Medications (new and continuing) along with side effects   Preventative information such as vaccines and smoking cessations   Diet   Pain   I know when to contact my Doctor's office or seek emergency care      I want my nurse to describe the side effects of my medications: YES NO   If the  answer is no, I understand the side effects of my medications: YES NO   My nurse described the side effects of my medications in a way that I could understand: YES NO   I have taken my personal belongings and my own medications with me at discharge: YES NO            Should a home visit be schedule with you:  a notification from your provider will be made prior to the visit.  If you have any questions or concerns about the visit, contact your provider.      I have received this information and my questions have been answered. I have discussed any concerns I see with this plan with the nurse or physician. I understand these instructions.    Signature of Patient or Responsible Person: _____________________________________    Date: _________________  Time: __________________    Signature of Healthcare Provider: _______________________________________  Date: _________________  Time: __________________

## 2017-01-19 NOTE — NURSING NOTE
Rec'd report via phone from Ceci cast ob nurse, I will be taking over pt's care, pt is currently in room 5056

## 2017-01-19 NOTE — L&D DELIVERY NOTE
[place]  Vaginal Delivery Note    Delivery     Delivery: Vaginal, Spontaneous Delivery     YOB: 2018    Time of Birth: 8:27 AM      Anesthesia: Spinal     Delivering clinician: Richard Sevilla    Forceps?   No   Vacuum? No    Shoulder dystocia present: No        Delivery narrative:  Pt presented in active labor this morning and rapidly progressed till she was C/C/+3.  An attempt at epidural was made but was unsuccessful and pt delivered 1LVM, DIXON, over 2nd deg perineal lac.  Double nuchal cord was doubly clamped and divided.  Uneventful delivery of shoulders.  Infant placed on maternal chest for kangaroo care.  Apgars 8,9.  EBL = 300.  No complications.  All sponge and instrument count were correct.      Infant    Findings: unspecified sex  infant     Infant observations: Weight: No birth weight on file.   Length:    in  Observations/Comments:         Apgars: 8   @ 1 minute /    9   @ 5 minutes   Infant Name:      Placenta, Cord, and Fluid    Placenta delivered  Spontaneous  at   1/19  8:30 AM     Cord: 3 vessels  present.   Nuchal Cord?  yes; Number of nuchal loops present:  2    Cord blood obtained: Yes    Cord gases obtained:  No    Cord gas results: Venous:  No results found for: PHCVEN    Arterial:  No results found for: PHCART     Repair    Episiotomy: Not recorded    Lacerations: Yes  Laceration Information  Laceration Repaired?   Perineal: 2nd  No     Periurethral:         Labial:         Sulcus:         Vaginal:         Cervical:            Estimated Blood Loss: Est. Blood Loss (mL): 300 mL (Filed from Delivery Summary) (01/19/17 0844)           Complications  none    Disposition  Mother to Mother Baby/Postpartum  in stable condition currently.  Baby to NBN  in stable condition currently.      Richard Sevilla MD  01/19/17  9:13 AM

## 2017-01-20 LAB
HCT VFR BLD AUTO: 27.8 % (ref 37–47)
HGB BLD-MCNC: 8.6 G/DL (ref 12–16)

## 2017-01-20 PROCEDURE — 90661 CCIIV3 VAC ABX FR 0.5 ML IM: CPT | Performed by: OBSTETRICS & GYNECOLOGY

## 2017-01-20 PROCEDURE — 90471 IMMUNIZATION ADMIN: CPT | Performed by: OBSTETRICS & GYNECOLOGY

## 2017-01-20 PROCEDURE — 85014 HEMATOCRIT: CPT | Performed by: OBSTETRICS & GYNECOLOGY

## 2017-01-20 PROCEDURE — 25010000002 TDAP 5-2.5-18.5 LF-MCG/0.5 SUSPENSION: Performed by: OBSTETRICS & GYNECOLOGY

## 2017-01-20 PROCEDURE — 25010000004 INFLUENZA VAC SUBUNIT QUAD 0.5 ML SUSPENSION PREFILLED SYRINGE: Performed by: OBSTETRICS & GYNECOLOGY

## 2017-01-20 PROCEDURE — G0009 ADMIN PNEUMOCOCCAL VACCINE: HCPCS | Performed by: OBSTETRICS & GYNECOLOGY

## 2017-01-20 PROCEDURE — G0008 ADMIN INFLUENZA VIRUS VAC: HCPCS | Performed by: OBSTETRICS & GYNECOLOGY

## 2017-01-20 PROCEDURE — 25010000004 PNEUMOCOCCAL VAC POLYVALENT PER 0.5 ML: Performed by: OBSTETRICS & GYNECOLOGY

## 2017-01-20 PROCEDURE — 90715 TDAP VACCINE 7 YRS/> IM: CPT | Performed by: OBSTETRICS & GYNECOLOGY

## 2017-01-20 PROCEDURE — 90732 PPSV23 VACC 2 YRS+ SUBQ/IM: CPT | Performed by: OBSTETRICS & GYNECOLOGY

## 2017-01-20 PROCEDURE — 85018 HEMOGLOBIN: CPT | Performed by: OBSTETRICS & GYNECOLOGY

## 2017-01-20 RX ORDER — FERROUS SULFATE TAB EC 324 MG (65 MG FE EQUIVALENT) 324 (65 FE) MG
325 TABLET DELAYED RESPONSE ORAL
Status: DISCONTINUED | OUTPATIENT
Start: 2017-01-20 | End: 2017-01-21 | Stop reason: HOSPADM

## 2017-01-20 RX ADMIN — PNEUMOCOCCAL VACCINE POLYVALENT 0.5 ML
25; 25; 25; 25; 25; 25; 25; 25; 25; 25; 25; 25; 25; 25; 25; 25; 25; 25; 25; 25; 25; 25; 25 INJECTION, SOLUTION INTRAMUSCULAR; SUBCUTANEOUS at 15:14

## 2017-01-20 RX ADMIN — HYDROCODONE BITARTATE AND ACETAMINOPHEN 1 TABLET: 5; 325 TABLET ORAL at 11:15

## 2017-01-20 RX ADMIN — HYDROCODONE BITARTATE AND ACETAMINOPHEN 1 TABLET: 5; 325 TABLET ORAL at 20:30

## 2017-01-20 RX ADMIN — HYDROCODONE BITARTATE AND ACETAMINOPHEN 1 TABLET: 5; 325 TABLET ORAL at 06:00

## 2017-01-20 RX ADMIN — DOCUSATE SODIUM 100 MG: 100 CAPSULE, LIQUID FILLED ORAL at 08:54

## 2017-01-20 RX ADMIN — DOCUSATE SODIUM 100 MG: 100 CAPSULE, LIQUID FILLED ORAL at 18:10

## 2017-01-20 RX ADMIN — HYDROCODONE BITARTATE AND ACETAMINOPHEN 1 TABLET: 5; 325 TABLET ORAL at 02:27

## 2017-01-20 RX ADMIN — TETANUS TOXOID, REDUCED DIPHTHERIA TOXOID AND ACELLULAR PERTUSSIS VACCINE, ADSORBED 0.5 ML: 5; 2.5; 8; 8; 2.5 SUSPENSION INTRAMUSCULAR at 15:13

## 2017-01-20 RX ADMIN — IBUPROFEN 800 MG: 800 TABLET ORAL at 23:15

## 2017-01-20 RX ADMIN — IBUPROFEN 800 MG: 800 TABLET ORAL at 05:59

## 2017-01-20 RX ADMIN — FERROUS SULFATE TAB EC 324 MG (65 MG FE EQUIVALENT) 324 MG: 324 (65 FE) TABLET DELAYED RESPONSE at 08:54

## 2017-01-20 RX ADMIN — PRENATAL VIT W/ FE FUMARATE-FA TAB 27-0.8 MG 1 TABLET: 27-0.8 TAB at 08:53

## 2017-01-20 RX ADMIN — INFLUENZA A VIRUS A/BRISBANE/10/2010 (H1N1) ANTIGEN (MDCK CELL DERIVED, PROPIOLACTONE INACTIVATED), INFLUENZA A VIRUS A/HONG KONG/4801/2014 (H3N2) ANTIGEN (MDCK CELL DERIVED, PROPIOLACTONE INACTIVATED), INFLUENZA B VIRUS B/UTAH/9/2014 ANTIGEN (MDCK CELL DERIVED, PROPIOLACTONE INACTIVATED) AND INFLUENZA B VIRUS B/HONG KONG/259/2010 ANTIGEN (MDCK CELL DERIVED, PROPIOLACTONE INACTIVATED) 0.5 ML: 15; 15; 15; 15 INJECTION, SUSPENSION INTRAMUSCULAR at 16:45

## 2017-01-20 RX ADMIN — HYDROCODONE BITARTATE AND ACETAMINOPHEN 1 TABLET: 5; 325 TABLET ORAL at 15:24

## 2017-01-20 RX ADMIN — IBUPROFEN 800 MG: 800 TABLET ORAL at 15:13

## 2017-01-20 NOTE — PROGRESS NOTES
"[facility] [place]  [place] Progress [place]    Subjective   Subjective  Postpartum Day 1: Vaginal Delivery    The patient feels well.  Her pain is well controlled with analgesics.   She is ambulating well.  Patient describes her bleeding as thin lochia.    Breastfeeding: infant latching.    Objective     Objective   Vital Signs Range for the last 24 hours  Temperature: Temp:  [97.8 °F (36.6 °C)-98.2 °F (36.8 °C)] 97.8 °F (36.6 °C)   Temp Source: Temp src: Oral   BP: BP: ()/(56-72) 102/68   Pulse: Heart Rate:  [] 100   Respirations: Resp:  [18] 18   SPO2: SpO2:  [95 %-98 %] 95 %   O2 Amount (l/min):     O2 Devices O2 Device: room air   Weight:       Admit Height:  Height: 64\" (162.6 cm)    Physical Exam:  General:  no acute distresss.  Abdomen: abdomen is soft without significant tenderness, masses, organomegaly or guarding. Fundus: appropriate, firm, non tender  Extremities: normal, atraumatic, no cyanosis, and trace edema.       Lab results reviewed:  Yes hgb = 8.6  Rubella:  No results found for: RUBELLAIGGIN Nurse Transcribed from prenatal record --  No components found for: EXTRUBELQUAL  Rh Status:    RH TYPE   Date Value Ref Range Status   01/19/2017 Positive  Final     Immunizations: There is no immunization history for the selected administration types on file for this patient.    Assessment/Plan   Assessment & Plan  Active Problems:    Normal labor      Caroline Sow is Day 1  post-partum  Vaginal, Spontaneous Delivery    .      Plan:  Continue current care.      Richard Sevilla MD  1/20/2017  12:47 PM    "

## 2017-01-20 NOTE — NURSING NOTE
Pt. Informed she will have to have a spinal for BTL or wait 6 weeks PP. Pt. Decided to wait the 6 weeks because her back is too sore from the multiple epidural attempts. Pre-Op and Dr. Sevilla notified pt. Decided to cancel BTL today and wait 6 weeks.

## 2017-01-21 VITALS
SYSTOLIC BLOOD PRESSURE: 107 MMHG | OXYGEN SATURATION: 94 % | RESPIRATION RATE: 20 BRPM | HEIGHT: 64 IN | BODY MASS INDEX: 25.61 KG/M2 | WEIGHT: 150 LBS | DIASTOLIC BLOOD PRESSURE: 78 MMHG | HEART RATE: 96 BPM | TEMPERATURE: 98.2 F

## 2017-01-21 RX ORDER — PRENATAL VIT/IRON FUM/FOLIC AC 27MG-0.8MG
1 TABLET ORAL DAILY
Qty: 100 TABLET | Refills: 0 | Status: SHIPPED | OUTPATIENT
Start: 2017-01-21 | End: 2017-06-06

## 2017-01-21 RX ORDER — FERROUS SULFATE TAB EC 324 MG (65 MG FE EQUIVALENT) 324 (65 FE) MG
325 TABLET DELAYED RESPONSE ORAL
Qty: 100 TABLET | Refills: 0 | Status: SHIPPED | OUTPATIENT
Start: 2017-01-21 | End: 2017-06-06

## 2017-01-21 RX ORDER — IBUPROFEN 800 MG/1
800 TABLET ORAL EVERY 8 HOURS PRN
Qty: 30 TABLET | Refills: 0 | Status: SHIPPED | OUTPATIENT
Start: 2017-01-21 | End: 2017-06-06

## 2017-01-21 RX ORDER — PSEUDOEPHEDRINE HCL 30 MG
100 TABLET ORAL 2 TIMES DAILY
Qty: 30 CAPSULE | Refills: 0 | Status: SHIPPED | OUTPATIENT
Start: 2017-01-21 | End: 2017-06-06

## 2017-01-21 RX ORDER — HYDROCODONE BITARTRATE AND ACETAMINOPHEN 5; 325 MG/1; MG/1
2 TABLET ORAL EVERY 4 HOURS PRN
Qty: 30 TABLET | Refills: 0 | Status: SHIPPED | OUTPATIENT
Start: 2017-01-21 | End: 2017-01-29

## 2017-01-21 RX ADMIN — HYDROCODONE BITARTATE AND ACETAMINOPHEN 1 TABLET: 5; 325 TABLET ORAL at 08:44

## 2017-01-21 RX ADMIN — DOCUSATE SODIUM 100 MG: 100 CAPSULE, LIQUID FILLED ORAL at 08:33

## 2017-01-21 RX ADMIN — HYDROCODONE BITARTATE AND ACETAMINOPHEN 1 TABLET: 5; 325 TABLET ORAL at 00:15

## 2017-01-21 RX ADMIN — FERROUS SULFATE TAB EC 324 MG (65 MG FE EQUIVALENT) 324 MG: 324 (65 FE) TABLET DELAYED RESPONSE at 08:33

## 2017-01-21 RX ADMIN — HYDROCODONE BITARTATE AND ACETAMINOPHEN 1 TABLET: 5; 325 TABLET ORAL at 18:16

## 2017-01-21 RX ADMIN — DOCUSATE SODIUM 100 MG: 100 CAPSULE, LIQUID FILLED ORAL at 18:16

## 2017-01-21 RX ADMIN — IBUPROFEN 800 MG: 800 TABLET ORAL at 16:01

## 2017-01-21 RX ADMIN — HYDROCODONE BITARTATE AND ACETAMINOPHEN 1 TABLET: 5; 325 TABLET ORAL at 13:10

## 2017-01-21 RX ADMIN — PRENATAL VIT W/ FE FUMARATE-FA TAB 27-0.8 MG 1 TABLET: 27-0.8 TAB at 08:33

## 2017-01-21 RX ADMIN — HYDROCODONE BITARTATE AND ACETAMINOPHEN 1 TABLET: 5; 325 TABLET ORAL at 04:36

## 2017-01-21 NOTE — PLAN OF CARE
Problem: Patient Care Overview (Adult)  Goal: Plan of Care Review  Outcome: Ongoing (interventions implemented as appropriate)    01/21/17 0933   Coping/Psychosocial Response Interventions   Plan Of Care Reviewed With patient   Patient Care Overview   Progress improving       Goal: Adult Individualization and Mutuality  Outcome: Ongoing (interventions implemented as appropriate)    01/21/17 0933   Individualization   Patient Specific Goals pain meds as prescribed   Patient Specific Interventions pain meds as prescribed       Goal: Discharge Needs Assessment  Outcome: Ongoing (interventions implemented as appropriate)    01/21/17 0933   Discharge Needs Assessment   Concerns To Be Addressed no discharge needs identified   Readmission Within The Last 30 Days no previous admission in last 30 days   Equipment Needed After Discharge none   Current Health   Anticipated Changes Related to Illness none   Self-Care   Equipment Currently Used at Home none   Living Environment   Transportation Available car         Problem: Postpartum, Vaginal Delivery (Adult)  Intervention: Support Life/Role Transition    01/21/17 0933   Coping/Psychosocial Interventions   Parent/Child Attachment Promotion attachment promoted;rooming-in promoted;positive reinforcement provided;strengths emphasized;taught/modeled caring behavior;skin-to-skin contact encouraged   Environmental Support calm environment promoted;rooming-in facilitated;personal routine supported;environmental consistency promoted;distractions minimized   Coping Strategies   Trust Relationship/Rapport care explained;choices provided;emotional support provided;empathic listening provided;questions answered;questions encouraged   Family/Support System Care caregiver stress acknowledged;involvement promoted;support provided       Intervention: Minimize/Manage Infection Risk    01/21/17 0933   Hygiene Care Assistance   Perineal Care absorbent pad changed;perianal area cleansed;ice  pack;cleansed         Goal: Signs and Symptoms of Listed Potential Problems Will be Absent or Manageable (Postpartum, Vaginal Delivery)  Outcome: Ongoing (interventions implemented as appropriate)    01/21/17 0946   Postpartum, Vaginal Delivery   Problems Assessed (Postpartum Vaginal Delivery) all   Problems Present (Postpartum Vaginal Delivery) none

## 2017-01-21 NOTE — DISCHARGE SUMMARY
Date of Discharge:  1/21/2017    Discharge Diagnosis:   S/p vag delivery.    Problem List:  Active Problems:    Normal labor      Presenting Problem/History of Present Illness  Normal labor [O80, Z37.9]  Normal vaginal delivery [O80]        Hospital Course  Patient is a 26 y.o. female presented in active labor and rapidly delivered.      Procedures Performed  Procedure(s):  POSTPARTUM TUBAL LIGATION       Consults:   Consults     No orders found from 12/21/2016 to 1/20/2017.          Pertinent Test Results:   Lab Results (last 24 hours)     ** No results found for the last 24 hours. **          Condition on Discharge:  satis    Vital Signs  Temp:  [98 °F (36.7 °C)-98.2 °F (36.8 °C)] 98.2 °F (36.8 °C)  Heart Rate:  [] 96  Resp:  [18-20] 20  BP: ()/(58-78) 107/78    Discharge Disposition  Home or Self Care    Discharge Medications   Caroline Sow   Home Medication Instructions [facility]:[id number]    Printed on:01/21/17 112   Medication Information                      docusate sodium 100 MG capsule  Take 100 mg by mouth 2 (Two) Times a Day.             ferrous sulfate 324 (65 FE) MG tablet delayed-release EC tablet  Take 1 tablet by mouth Daily With Breakfast.             HYDROcodone-acetaminophen (NORCO) 5-325 MG per tablet  Take 2 tablets by mouth Every 4 (Four) Hours As Needed for severe pain (7-10) for up to 8 days.             ibuprofen (ADVIL,MOTRIN) 800 MG tablet  Take 1 tablet by mouth Every 8 (Eight) Hours As Needed for mild pain (1-3).             Prenatal Vit-Fe Fumarate-FA (PRENATAL VITAMIN 27-0.8) 27-0.8 MG tablet tablet  Take 1 tablet by mouth Daily.                 Discharge Diet   Diet Instructions     Diet: Regular; Thin Liquids, No Restrictions       Discharge Diet:  Regular   Fluid Consistency:  Thin Liquids, No Restrictions                 Activity at Discharge  Activity Instructions     Activity as Tolerated           Pelvic Rest                     Follow-up Appointments  No  future appointments.  Referrals and Follow-ups to Schedule     Call MD With Problems / Concerns    As directed    Instructions: No driving for 2 wks, call for temp>101, heavy vaginal bleeding or increasing lower abdominal pain.  Cont prenatal vits for 6 wks or as long as pt breastfeeds.  No [place] or tampons for 6 weeks.  Bath or shower.  Call for any problems with postpartum depression.       Follow-Up    As directed    6 weeks for postpartum exam.  Call to make apt and remind the office that you want your tubes tied.                     Richard Sevilla MD   11:28 AM  01/21/17

## 2017-03-07 ENCOUNTER — PREP FOR SURGERY (OUTPATIENT)
Dept: OBSTETRICS AND GYNECOLOGY | Facility: HOSPITAL | Age: 27
End: 2017-03-07

## 2017-03-07 DIAGNOSIS — Z30.2 REQUEST FOR STERILIZATION: Primary | ICD-10-CM

## 2017-03-07 RX ORDER — SODIUM CHLORIDE 0.9 % (FLUSH) 0.9 %
1-10 SYRINGE (ML) INJECTION AS NEEDED
Status: CANCELLED | OUTPATIENT
Start: 2017-03-07

## 2017-03-08 ENCOUNTER — ANESTHESIA EVENT (OUTPATIENT)
Dept: PERIOP | Facility: HOSPITAL | Age: 27
End: 2017-03-08

## 2017-03-09 ENCOUNTER — ANESTHESIA (OUTPATIENT)
Dept: PERIOP | Facility: HOSPITAL | Age: 27
End: 2017-03-09

## 2017-03-09 ENCOUNTER — HOSPITAL ENCOUNTER (OUTPATIENT)
Facility: HOSPITAL | Age: 27
Setting detail: HOSPITAL OUTPATIENT SURGERY
Discharge: HOME | End: 2017-03-09
Attending: OBSTETRICS & GYNECOLOGY | Admitting: OBSTETRICS & GYNECOLOGY

## 2017-03-09 VITALS
RESPIRATION RATE: 16 BRPM | OXYGEN SATURATION: 98 % | SYSTOLIC BLOOD PRESSURE: 98 MMHG | WEIGHT: 135 LBS | HEART RATE: 75 BPM | TEMPERATURE: 97.8 F | DIASTOLIC BLOOD PRESSURE: 71 MMHG | BODY MASS INDEX: 23.17 KG/M2

## 2017-03-09 LAB — HCG SERPL QL: NEGATIVE

## 2017-03-09 PROCEDURE — 25010000002 DEXAMETHASONE PER 1 MG: Performed by: NURSE ANESTHETIST, CERTIFIED REGISTERED

## 2017-03-09 PROCEDURE — 25010000002 NEOSTIGMINE 10 MG/10ML SOLUTION: Performed by: NURSE ANESTHETIST, CERTIFIED REGISTERED

## 2017-03-09 PROCEDURE — 25010000002 KETOROLAC TROMETHAMINE PER 15 MG: Performed by: NURSE ANESTHETIST, CERTIFIED REGISTERED

## 2017-03-09 PROCEDURE — 25010000002 FENTANYL CITRATE (PF) 100 MCG/2ML SOLUTION: Performed by: NURSE ANESTHETIST, CERTIFIED REGISTERED

## 2017-03-09 PROCEDURE — 84703 CHORIONIC GONADOTROPIN ASSAY: CPT | Performed by: OBSTETRICS & GYNECOLOGY

## 2017-03-09 PROCEDURE — 25010000002 HYDROMORPHONE PER 4 MG: Performed by: NURSE ANESTHETIST, CERTIFIED REGISTERED

## 2017-03-09 PROCEDURE — 25010000002 ONDANSETRON PER 1 MG: Performed by: NURSE ANESTHETIST, CERTIFIED REGISTERED

## 2017-03-09 PROCEDURE — 25010000002 MIDAZOLAM PER 1 MG: Performed by: NURSE ANESTHETIST, CERTIFIED REGISTERED

## 2017-03-09 PROCEDURE — 25010000002 PROPOFOL 10 MG/ML EMULSION: Performed by: NURSE ANESTHETIST, CERTIFIED REGISTERED

## 2017-03-09 RX ORDER — ONDANSETRON 2 MG/ML
4 INJECTION INTRAMUSCULAR; INTRAVENOUS ONCE AS NEEDED
Status: COMPLETED | OUTPATIENT
Start: 2017-03-09 | End: 2017-03-09

## 2017-03-09 RX ORDER — KETOROLAC TROMETHAMINE 30 MG/ML
INJECTION, SOLUTION INTRAMUSCULAR; INTRAVENOUS AS NEEDED
Status: DISCONTINUED | OUTPATIENT
Start: 2017-03-09 | End: 2017-03-09 | Stop reason: SURG

## 2017-03-09 RX ORDER — IBUPROFEN 800 MG/1
800 TABLET ORAL EVERY 8 HOURS PRN
Qty: 30 TABLET | Refills: 0 | Status: SHIPPED | OUTPATIENT
Start: 2017-03-09 | End: 2017-06-06

## 2017-03-09 RX ORDER — OXYCODONE HYDROCHLORIDE AND ACETAMINOPHEN 5; 325 MG/1; MG/1
2 TABLET ORAL EVERY 4 HOURS PRN
Qty: 20 TABLET | Refills: 0 | Status: SHIPPED | OUTPATIENT
Start: 2017-03-09 | End: 2017-03-19

## 2017-03-09 RX ORDER — LIDOCAINE HYDROCHLORIDE 20 MG/ML
INJECTION, SOLUTION INFILTRATION; PERINEURAL AS NEEDED
Status: DISCONTINUED | OUTPATIENT
Start: 2017-03-09 | End: 2017-03-09 | Stop reason: SURG

## 2017-03-09 RX ORDER — MIDAZOLAM HYDROCHLORIDE 1 MG/ML
2 INJECTION INTRAMUSCULAR; INTRAVENOUS
Status: DISCONTINUED | OUTPATIENT
Start: 2017-03-09 | End: 2017-03-10 | Stop reason: HOSPADM

## 2017-03-09 RX ORDER — PROPOFOL 10 MG/ML
VIAL (ML) INTRAVENOUS AS NEEDED
Status: DISCONTINUED | OUTPATIENT
Start: 2017-03-09 | End: 2017-03-09 | Stop reason: SURG

## 2017-03-09 RX ORDER — HYDROCODONE BITARTRATE AND ACETAMINOPHEN 5; 325 MG/1; MG/1
1 TABLET ORAL ONCE AS NEEDED
Status: COMPLETED | OUTPATIENT
Start: 2017-03-09 | End: 2017-03-09

## 2017-03-09 RX ORDER — NEOSTIGMINE METHYLSULFATE 1 MG/ML
INJECTION, SOLUTION INTRAVENOUS AS NEEDED
Status: DISCONTINUED | OUTPATIENT
Start: 2017-03-09 | End: 2017-03-09 | Stop reason: SURG

## 2017-03-09 RX ORDER — ESMOLOL HYDROCHLORIDE 10 MG/ML
INJECTION INTRAVENOUS AS NEEDED
Status: DISCONTINUED | OUTPATIENT
Start: 2017-03-09 | End: 2017-03-09 | Stop reason: SURG

## 2017-03-09 RX ORDER — HYDROMORPHONE HCL 110MG/55ML
1 PATIENT CONTROLLED ANALGESIA SYRINGE INTRAVENOUS
Status: DISCONTINUED | OUTPATIENT
Start: 2017-03-09 | End: 2017-03-10 | Stop reason: HOSPADM

## 2017-03-09 RX ORDER — SODIUM CHLORIDE 0.9 % (FLUSH) 0.9 %
1-10 SYRINGE (ML) INJECTION AS NEEDED
Status: DISCONTINUED | OUTPATIENT
Start: 2017-03-09 | End: 2017-03-10 | Stop reason: HOSPADM

## 2017-03-09 RX ORDER — DEXAMETHASONE SODIUM PHOSPHATE 4 MG/ML
8 INJECTION, SOLUTION INTRA-ARTICULAR; INTRALESIONAL; INTRAMUSCULAR; INTRAVENOUS; SOFT TISSUE ONCE
Status: COMPLETED | OUTPATIENT
Start: 2017-03-09 | End: 2017-03-09

## 2017-03-09 RX ORDER — SODIUM CHLORIDE, SODIUM LACTATE, POTASSIUM CHLORIDE, CALCIUM CHLORIDE 600; 310; 30; 20 MG/100ML; MG/100ML; MG/100ML; MG/100ML
9 INJECTION, SOLUTION INTRAVENOUS CONTINUOUS
Status: DISCONTINUED | OUTPATIENT
Start: 2017-03-09 | End: 2017-03-10 | Stop reason: HOSPADM

## 2017-03-09 RX ORDER — MAGNESIUM HYDROXIDE 1200 MG/15ML
LIQUID ORAL AS NEEDED
Status: DISCONTINUED | OUTPATIENT
Start: 2017-03-09 | End: 2017-03-09 | Stop reason: HOSPADM

## 2017-03-09 RX ORDER — GLYCOPYRROLATE 0.2 MG/ML
INJECTION INTRAMUSCULAR; INTRAVENOUS AS NEEDED
Status: DISCONTINUED | OUTPATIENT
Start: 2017-03-09 | End: 2017-03-09 | Stop reason: SURG

## 2017-03-09 RX ORDER — LIDOCAINE HYDROCHLORIDE 10 MG/ML
0.3 INJECTION, SOLUTION EPIDURAL; INFILTRATION; INTRACAUDAL; PERINEURAL ONCE
Status: COMPLETED | OUTPATIENT
Start: 2017-03-09 | End: 2017-03-09

## 2017-03-09 RX ORDER — FENTANYL CITRATE 50 UG/ML
INJECTION, SOLUTION INTRAMUSCULAR; INTRAVENOUS AS NEEDED
Status: DISCONTINUED | OUTPATIENT
Start: 2017-03-09 | End: 2017-03-09 | Stop reason: SURG

## 2017-03-09 RX ORDER — ROCURONIUM BROMIDE 10 MG/ML
INJECTION, SOLUTION INTRAVENOUS AS NEEDED
Status: DISCONTINUED | OUTPATIENT
Start: 2017-03-09 | End: 2017-03-09 | Stop reason: SURG

## 2017-03-09 RX ORDER — MIDAZOLAM HYDROCHLORIDE 1 MG/ML
1 INJECTION INTRAMUSCULAR; INTRAVENOUS
Status: DISCONTINUED | OUTPATIENT
Start: 2017-03-09 | End: 2017-03-10 | Stop reason: HOSPADM

## 2017-03-09 RX ORDER — HYDROCODONE BITARTRATE AND ACETAMINOPHEN 5; 325 MG/1; MG/1
TABLET ORAL
Status: COMPLETED
Start: 2017-03-09 | End: 2017-03-09

## 2017-03-09 RX ORDER — FAMOTIDINE 10 MG/ML
20 INJECTION, SOLUTION INTRAVENOUS
Status: DISCONTINUED | OUTPATIENT
Start: 2017-03-09 | End: 2017-03-10 | Stop reason: HOSPADM

## 2017-03-09 RX ADMIN — GLYCOPYRROLATE 0.4 MG: 0.2 INJECTION INTRAMUSCULAR; INTRAVENOUS at 11:43

## 2017-03-09 RX ADMIN — NEOSTIGMINE METHYLSULFATE 3 MG: 1 INJECTION INTRAVENOUS at 11:43

## 2017-03-09 RX ADMIN — ESMOLOL HYDROCHLORIDE 5 MG: 10 INJECTION, SOLUTION INTRAVENOUS at 11:35

## 2017-03-09 RX ADMIN — FAMOTIDINE 20 MG: 10 INJECTION, SOLUTION INTRAVENOUS at 09:24

## 2017-03-09 RX ADMIN — PROPOFOL 50 MG: 10 INJECTION, EMULSION INTRAVENOUS at 11:14

## 2017-03-09 RX ADMIN — ONDANSETRON 4 MG: 2 INJECTION, SOLUTION INTRAMUSCULAR; INTRAVENOUS at 12:12

## 2017-03-09 RX ADMIN — FENTANYL CITRATE 100 MCG: 50 INJECTION, SOLUTION INTRAMUSCULAR; INTRAVENOUS at 11:12

## 2017-03-09 RX ADMIN — LIDOCAINE HYDROCHLORIDE 0.1 ML: 10 INJECTION, SOLUTION EPIDURAL; INFILTRATION; INTRACAUDAL; PERINEURAL at 08:50

## 2017-03-09 RX ADMIN — ESMOLOL HYDROCHLORIDE 30 MG: 10 INJECTION, SOLUTION INTRAVENOUS at 11:19

## 2017-03-09 RX ADMIN — ROCURONIUM BROMIDE 25 MG: 10 INJECTION INTRAVENOUS at 11:12

## 2017-03-09 RX ADMIN — SODIUM CHLORIDE, POTASSIUM CHLORIDE, SODIUM LACTATE AND CALCIUM CHLORIDE: 600; 310; 30; 20 INJECTION, SOLUTION INTRAVENOUS at 11:12

## 2017-03-09 RX ADMIN — HYDROMORPHONE HYDROCHLORIDE 1 MG: 2 INJECTION, SOLUTION INTRAMUSCULAR; INTRAVENOUS; SUBCUTANEOUS at 12:12

## 2017-03-09 RX ADMIN — MIDAZOLAM HYDROCHLORIDE 1 MG: 1 INJECTION, SOLUTION INTRAMUSCULAR; INTRAVENOUS at 10:42

## 2017-03-09 RX ADMIN — SODIUM CHLORIDE, POTASSIUM CHLORIDE, SODIUM LACTATE AND CALCIUM CHLORIDE 9 ML/HR: 600; 310; 30; 20 INJECTION, SOLUTION INTRAVENOUS at 08:50

## 2017-03-09 RX ADMIN — DEXAMETHASONE SODIUM PHOSPHATE 8 MG: 4 INJECTION, SOLUTION INTRAMUSCULAR; INTRAVENOUS at 09:25

## 2017-03-09 RX ADMIN — HYDROCODONE BITARTRATE AND ACETAMINOPHEN 1 TABLET: 5; 325 TABLET ORAL at 13:03

## 2017-03-09 RX ADMIN — MIDAZOLAM HYDROCHLORIDE 1 MG: 1 INJECTION, SOLUTION INTRAMUSCULAR; INTRAVENOUS at 10:56

## 2017-03-09 RX ADMIN — ESMOLOL HYDROCHLORIDE 5 MG: 10 INJECTION, SOLUTION INTRAVENOUS at 11:34

## 2017-03-09 RX ADMIN — KETOROLAC TROMETHAMINE 30 MG: 30 INJECTION, SOLUTION INTRAMUSCULAR; INTRAVENOUS at 11:40

## 2017-03-09 RX ADMIN — PROPOFOL 150 MG: 10 INJECTION, EMULSION INTRAVENOUS at 11:12

## 2017-03-09 RX ADMIN — LIDOCAINE HYDROCHLORIDE 80 MG: 20 INJECTION, SOLUTION INFILTRATION; PERINEURAL at 11:12

## 2017-03-09 RX ADMIN — ONDANSETRON 4 MG: 2 INJECTION, SOLUTION INTRAMUSCULAR; INTRAVENOUS at 09:24

## 2017-03-09 NOTE — OP NOTE
Date of Operation:  03/09/2017    PREOPERATIVE DIAGNOSIS: Patient desires permanent sterilization.    POSTOPERATIVE DIAGNOSIS: Patient desires permanent sterilization.    PROCEDURE PERFORMED: Laparoscopic bilateral tubal coagulation.    SURGEON: Richard Sevilla MD    ASSISTANT: None.    ESTIMATED BLOOD LOSS: 1 mL.    COMPLICATIONS: None.    DRAINS: None.    SPECIMENS: None.    FINDINGS: Normal pelvic and abdominal cavities.    ANESTHESIA: General endotracheal.    DESCRIPTION OF PROCEDURE: With the patient in the dorsal lithotomy position after general endotracheal anesthesia and an OGT had been placed, she was prepped and draped. Examination under anesthesia was unremarkable.    Timeout was done. No antibiotics were given.    A sponge stick was placed in the vagina for uterine manipulation.    A left upper quadrant midaxillary subcostal 5 mm port was placed without incident. The abdomen was insufflated. Accessory port 5 mm was placed in the infraumbilical region in the midline and through these ports, various instruments were used to complete the procedure.    The pelvic and abdominal cavities were seen in their entirety. There were no abnormalities. The right fallopian tube was identified and traced out to its fimbriated end. A portion in the isthmic region was selected and coagulated in 3 contiguous areas and cut in the distal 3rd. This was repeated on the left-hand side in an identical fashion. Both occlusions appeared to be adequate for permanent sterilization.    All instruments were removed. The abdomen was deflated. The skin was reapproximated 2-0 Ethibond simple interrupted sutures. Sponge stick removed. She tolerated the procedure well and went to the recovery room in apparently good condition. All sponge, instrument and needle counts were correct x3, according operating room personnel.    DISPOSITION: She was to be discharged home to return to office in 1 week for suture removal, diet and activity as  tolerated. She is to call for fever greater than 101, heavy vaginal bleeding, or increasing lower abdominal pain.    MEDICATIONS:  1.  Ibuprofen 800 mg, #30, 1 p.o. t.i.d. p.r.n.  2.  Percocet 5 mg, #20, 1-2 every 3-4 hours as needed.    Instructions and precautions were given.      Richard Sevilla M.D.*  Freddie  D:  03/09/2017 11:46:49   T:  03/09/2017 12:34:15   Job ID:  [id number]   Document ID:  [id number]  cc:

## 2017-03-09 NOTE — BRIEF OP NOTE
TUBAL COAGULATION LAPAROSCOPIC  Procedure Note    Caroline Sow  3/9/2017    Pre-op Diagnosis:   Request for sterilization [Z78.9]    Post-op Diagnosis:     Post-Op Diagnosis Codes:     * Request for sterilization [Z78.9]    Procedure/CPT® Codes:      Procedure(s):  TUBAL COAGULATION LAPAROSCOPIC    Surgeon(s):  Richard Sevilla MD    Anesthesia: General    Staff:   Circulator: Valeriano Scott RN; Vickie Navarrete RN  Scrub Person: Tarah Mackey    Estimated Blood Loss: * No values recorded between 3/9/2017 11:08 AM and 3/9/2017 11:43 AM *  Urine Voided: * No values recorded between 3/9/2017 11:08 AM and 3/9/2017 11:43 AM *    Specimens:                * No specimens in log *      Drains:           Findings: normal pelvis and abdominal cavities.     Complications: none  Dictated: 11:46 AM  03/09/17          Richard Sevilla MD     Date: 3/9/2017  Time: 11:43 AM

## 2017-03-09 NOTE — PLAN OF CARE
Problem: Patient Care Overview (Adult)  Goal: Plan of Care Review  Outcome: Ongoing (interventions implemented as appropriate)    03/09/17 1247   Coping/Psychosocial Response Interventions   Plan Of Care Reviewed With patient   Patient Care Overview   Progress no change   Outcome Evaluation   Outcome Summary/Follow up Plan VSS, awaiting discharge       Goal: Adult Individualization and Mutuality  Outcome: Ongoing (interventions implemented as appropriate)    03/09/17 1247   Mutuality/Individual Preferences   What Anxieties, Fears or Concerns Do You Have About Your Health or Care? Denies         Problem: Perioperative Period (Adult)  Goal: Signs and Symptoms of Listed Potential Problems Will be Absent or Manageable (Perioperative Period)  Outcome: Ongoing (interventions implemented as appropriate)    03/09/17 1247   Perioperative Period   Problems Assessed (Perioperative Period) all   Problems Present (Perioperative Period) none

## 2017-03-09 NOTE — H&P
SAIMA Fong  Obstetric History and Physical    No chief complaint on file.      Subjective     Patient is a 26 y.o. female  currently at 38w1d, who presents with desire for permanent sterilizaiton.  R/B/A/fr explained to pt who verbalized her understanding.    Her prenatal care is benign.  Her previous obstetric/gynecological history is noted for is non-contributory.    The following portions of the patients history were reviewed and updated as appropriate: current medications, allergies, past medical history, past surgical history, past family history, past social history and problem list .       Prenatal Information:  Prenatal Results         1st Trimester Ref. Range Date Time   CBC with auto diff       Rubella IgG ^ Immune   16    Hepatitis B SAg ^ Negative   16    RPR       ABO  O   17 0541   Rh  Positive   17 0541   Anibody Screen  Negative   17 0541   HIV ^ Negative   16    Varicella IgG       Urinalysis with microscopy       Urine Culture       GC/Chlamydia/TV       ThinPrep/Pap       2nd and 3rd Trimester Ref. Range Date Time   Hemoglobin / Hematocrit  27.8 % (L) 37.0 - 47.0 % 17 0604   Hemoglobin  8.6 g/dL (L) 12.0 - 16.0 g/dL 17 0604   Group B Strep Culture       Glucose Challege Test 1 hour       Glucose Tolerance Test 3 hours       Pre-eclampsia Panel       Risk Screening Ref. Range Date Time   Fetal Fibronectin       Amnisure       Hepatitis C Antibody ^ Negative   16    Hemoglobin electrophoresis       Cystic Fibrosis       Hemoglobin A1C       MSAFP - 4       NIPT       AFP       Parvovirus IgG       Parvovirus IgM       POCT - glucose       [facility]       24 Hour urine - Total protein       24 Hour urine - Creatinine clearance       Urinalysis with microscopy       Urine Culture       Drug Screening Ref. Range Date Time   Amphetamine Screen  Negative  Negative 17 1105   Barbiturate Screen  Negative  Negative 17 1105    Benzodiazepine Screen  Negative  Negative 17 1105   Methadone Screen  Negative  Negative 17 1105   Phencyclidine Screen  Negative  Negative 17 1105   Opiates Screen  Negative  Negative 17 1105   THC Screen  Negative  Negative 17 1105   Cocaine Screen  Negative  Negative 17 1105   Amphetamine Screen       Propoxyphene Screen  Negative  Negative 17 1105   Buprenorphine Screen       Methamphetamine Screen       Oxycodone Screen       Tryicyclic Antidepressants Screen  Negative  Negative 17 1105          Legend: ^: Historical            View all results for this pregnancy        External Prenatal Results         Pregnancy Outside Results - these were transcribed from office records.  See scanned records for details. Date Time   Hgb ^ 11.1 g/dL 16    Hct ^ 36 % 16    ABO ^ O  16    Rh ^ Positive  16    Antibody Screen ^ Negative  16    Glucose Fasting GTT      Glucose Tolerance Test 1 hour      Glucose Tolerance Test 3 hour      Gonorrhea (discrete) ^ Negative  16    Chlamydia (discrete) ^ Negative  16    RPR      VDRL ^ Negative  16    Syphillis Antibody      Rubella ^ Immune  16    HBsAg ^ Negative  16    Herpes Simplex Virus PCR      Herpes Simplex VIrus Culture      HIV ^ Negative  16    Hep C RNA Quant PCR      Hep C Antibody ^ Negative  16    Urine Drug Screen ^ positive   16    AFP      Group B Strep      GBS Susceptibility to Clindamycin      GBS Susceptibility to Eythromycin      Fetal Fibronectin      Genetic Testing, Maternal Blood             Legend: ^: Historical           Past OB History:     Obstetric History       T2      TAB0   SAB0   E0   M0   L2       # Outcome Date GA Lbr Ramon/2nd Weight Sex Delivery Anes PTL Lv   2 Term 17 38w1d / 00:17 6 lb 15.7 oz (3.167 kg) M Vag-Spont Spinal N Y      Name: DAJUAN PALACIOKEELY      Apgar1:  8                Apgar5: 9   1  Term 09/03/10 39w3d  8 lb 10 oz (3.912 kg) M Vag-Spont EPI N Y      Name: Biju      Complications: Chorioamnionitis          Past Medical History: Past Medical History   Diagnosis Date   • Anxiety    • Depression    • IBS (irritable bowel syndrome)      no meds during pregnancy   • Kidney stone 2014   • Migraine    • Ovarian cyst 2014   • Seizures 2015     side effect of Effexor   • Trauma 2004     Dog Attack - left leg      Past Surgical History Past Surgical History   Procedure Laterality Date   • Cholecystectomy  2004   • [place] tooth extraction Bilateral 2007      Family History: History reviewed. No pertinent family history.   Social History:  reports that she has been smoking Cigarettes.  She started smoking about 9 years ago. She has been smoking about 0.50 packs per day. She has never used smokeless tobacco.   reports that she does not drink alcohol.   reports that she does not use illicit drugs.        Review of Systems      Objective     Vital Signs Range for the last 24 hours  Temperature: Temp:  [97.9 °F (36.6 °C)] 97.9 °F (36.6 °C)   Temp Source: Temp src: Oral   BP: BP: (112)/(73) 112/73   Pulse: Heart Rate:  [92] 92   Respirations: Resp:  [12] 12   SPO2: SpO2:  [98 %] 98 %   O2 Amount (l/min):     O2 Devices O2 Device: room air   Weight: Weight:  [135 lb (61.2 kg)] 135 lb (61.2 kg)     Physical Examination: General appearance - alert, well appearing, and in no distress  Mental status - alert, oriented to person, place, and time  Neck - supple, no significant adenopathy  Chest - clear to auscultation, no wheezes, rales or rhonchi, symmetric air entry  Heart - normal rate, regular rhythm, normal S1, S2, no murmurs, rubs, clicks or gallops  Abdomen - soft, nontender, nondistended, no masses or organomegaly  Back exam - full range of motion, no tenderness, palpable spasm or pain on motion  Neurological - alert, oriented, normal speech, no focal findings or movement disorder noted  Musculoskeletal - no  joint tenderness, deformity or swelling  Extremities - peripheral pulses normal, no pedal edema, no clubbing or cyanosis  Skin - normal coloration and turgor, no rashes, no suspicious skin lesions noted        Laboratory Results: reviewed  Radiology Review: none  Other Studies: none    Assessment/Plan     Active Problems:    * No active hospital problems. *      Assessment & Plan    Assessment:  Indicated sterilization.     Plan:  1. Laparoscopic bilateral tubal cauterization.   2. Plan of care has been reviewed with patient and mother.   3.  Risks, benefits of treatment plan have been discussed.  4.  All questions have been answered.      Richard Sevilla MD  3/9/2017  11:01 AM

## 2017-03-09 NOTE — PLAN OF CARE
Problem: Patient Care Overview (Adult)  Goal: Plan of Care Review  Outcome: Ongoing (interventions implemented as appropriate)    03/09/17 0902   Coping/Psychosocial Response Interventions   Plan Of Care Reviewed With patient;mother   Patient Care Overview   Progress no change   Outcome Evaluation   Outcome Summary/Follow up Plan Vital signs stasble; ready for procedure       Goal: Adult Individualization and Mutuality  Outcome: Ongoing (interventions implemented as appropriate)    Problem: Perioperative Period (Adult)  Goal: Signs and Symptoms of Listed Potential Problems Will be Absent or Manageable (Perioperative Period)  Outcome: Ongoing (interventions implemented as appropriate)

## 2017-03-09 NOTE — ANESTHESIA POSTPROCEDURE EVALUATION
Patient: Caroline Sow    Procedure Summary     Date Anesthesia Start Anesthesia Stop Room / Location    03/09/17 1109 1152 [facility] / [facility]       Procedure Diagnosis Surgeon Provider    TUBAL COAGULATION LAPAROSCOPIC (Bilateral Abdomen) Request for sterilization  (Request for sterilization [Z78.9]) MD Abhishek Salguero CRNA          Anesthesia Type: general  Last vitals  /72 (03/09/17 1151)    Temp 97.2 °F (36.2 °C) (03/09/17 1151)    Pulse 106 (03/09/17 1151)   Resp 16 (03/09/17 1151)    SpO2 100 % (03/09/17 1151)      Post Anesthesia Care and Evaluation    Patient location during evaluation: PACU  Patient participation: complete - patient participated  Level of consciousness: awake and alert  Pain score: 1  Pain management: adequate  Airway patency: patent  Anesthetic complications: No anesthetic complications  PONV Status: none  Cardiovascular status: acceptable  Respiratory status: acceptable  Hydration status: acceptable

## 2017-03-09 NOTE — ANESTHESIA PROCEDURE NOTES
Airway  Urgency: elective    Airway not difficult    General Information and Staff    Patient location during procedure: OR  CRNA: ZAIRA DIEGO    Indications and Patient Condition  Indications for airway management: airway protection    Preoxygenated: yes  MILS not maintained throughout  Mask difficulty assessment: 1 - vent by mask    Final Airway Details  Final airway type: endotracheal airway      Successful airway: ETT  Cuffed: yes   Successful intubation technique: direct laryngoscopy  Facilitating devices/methods: intubating stylet  Endotracheal tube insertion site: oral  Blade: Vargas  Blade size: #2  ETT size: 7.0 mm  Cormack-Lehane Classification: grade I - full view of glottis  Placement verified by: chest auscultation and capnometry   Cuff volume (mL): 6  Measured from: lips  ETT to lips (cm): 21  Number of attempts at approach: 1    Additional Comments  Atraumatic

## 2017-03-09 NOTE — ANESTHESIA PREPROCEDURE EVALUATION
Anesthesia Evaluation     Patient summary reviewed and Nursing notes reviewed   no history of anesthetic complications:  NPO Status: > 8 hours   Airway   Mallampati: II  TM distance: >3 FB  Neck ROM: full  no difficulty expected  Dental      Pulmonary     breath sounds clear to auscultation  (+) a smoker (1/2ppd) Current,   Cardiovascular - negative cardio ROS  Exercise tolerance: good (4-7 METS)    Rhythm: regular  Rate: normal        Neuro/Psych  (+) seizures (due to a medication she was taking, only one time ), headaches, psychiatric history Depression and Anxiety,    GI/Hepatic/Renal/Endo    (+)  PUD, hyperthyroidism (with first pregnancy)  Chronic renal disease: renal stones.    Musculoskeletal (-) negative ROS    Abdominal    Substance History - negative use     OB/GYN    Gestational age approximate: 7 weeks postpartum         Other - negative ROS                                 Anesthesia Plan    ASA 2     general     intravenous induction   Anesthetic plan and risks discussed with patient.  Use of blood products discussed with patient  Consented to blood products.

## 2017-06-06 ENCOUNTER — OFFICE VISIT (OUTPATIENT)
Dept: OBSTETRICS AND GYNECOLOGY | Facility: CLINIC | Age: 27
End: 2017-06-06

## 2017-06-06 VITALS
BODY MASS INDEX: 24.24 KG/M2 | WEIGHT: 142 LBS | HEIGHT: 64 IN | SYSTOLIC BLOOD PRESSURE: 98 MMHG | DIASTOLIC BLOOD PRESSURE: 70 MMHG

## 2017-06-06 DIAGNOSIS — R10.32 LLQ PAIN: Primary | ICD-10-CM

## 2017-06-06 PROCEDURE — 99213 OFFICE O/P EST LOW 20 MIN: CPT | Performed by: OBSTETRICS & GYNECOLOGY

## 2017-06-06 NOTE — PROGRESS NOTES
"      Caroline Sow is a 26 y.o. patient who presents for follow up of   Chief Complaint   Patient presents with   • Ovarian Cyst     Pain LLQ for 3 months since BTL was done. Periods normal, not related, no fevers, + discharge associated. Pain with sex and afterwards also. No dysuria. + pain with BMs.         The following portions of the patient's history were reviewed and updated as appropriate: allergies, current medications and problem list.    Review of Systems   Constitutional: Negative for appetite change, fever and unexpected weight change.   Respiratory: Negative for cough and shortness of breath.    Cardiovascular: Negative for chest pain and palpitations.   Gastrointestinal: Negative for abdominal distention, abdominal pain, constipation, diarrhea, nausea and vomiting.   Endocrine: Negative.    Genitourinary: Positive for pelvic pain. Negative for dyspareunia, menstrual problem and vaginal discharge.   Skin: Negative.    Hematological: Negative.    Psychiatric/Behavioral: Negative for dysphoric mood and sleep disturbance. The patient is not nervous/anxious.        BP 98/70  Ht 64\" (162.6 cm)  Wt 142 lb (64.4 kg)  LMP 05/09/2017 (Approximate)  BMI 24.37 kg/m2    Physical Exam   Constitutional: She is oriented to person, place, and time. She appears well-developed and well-nourished.   HENT:   Head: Normocephalic and atraumatic.   Pulmonary/Chest: Effort normal.   Abdominal: Soft. Bowel sounds are normal. She exhibits no distension and no mass. There is no tenderness. There is no rebound and no guarding.   Musculoskeletal: Normal range of motion.   Neurological: She is alert and oriented to person, place, and time.   Skin: Skin is warm and dry.   Psychiatric: She has a normal mood and affect. Her behavior is normal. Judgment and thought content normal.   Nursing note and vitals reviewed.        Assessment/Plan   Caroline was seen today for ovarian cyst.    Diagnoses and all orders for this " visit:    LLQ pain    Check US for cysts, if present consider OCPs for 3 months. If gets better then consider LSO.         Return in about 7 days (around 6/13/2017) for US, TV, Follow up with me.      Kevin Holloway MD    6/6/2017  3:16 PM

## (undated) DEVICE — APPL CHLORAPREP W/TINT 26ML ORNG

## (undated) DEVICE — GOWN,PREVENTION PLUS,XXLARGE,STERILE: Brand: MEDLINE

## (undated) DEVICE — GLV SURG SENSICARE W/ALOE PF LF 7.5 STRL

## (undated) DEVICE — SUCTION CANISTER, 1000CC,SAFELINER: Brand: DEROYAL

## (undated) DEVICE — BNDG ADHS SHEER 1X3IN

## (undated) DEVICE — PAD PERI POST

## (undated) DEVICE — ENDOPATH XCEL UNIVERSAL TROCAR STABLILITY SLEEVES: Brand: ENDOPATH XCEL

## (undated) DEVICE — LAG GYN LAPAROSCOPY: Brand: MEDLINE INDUSTRIES, INC.

## (undated) DEVICE — DRP Z/FRICTION 10X16IN

## (undated) DEVICE — ENDOPATH XCEL BLADELESS TROCARS WITH STABILITY SLEEVES: Brand: ENDOPATH XCEL

## (undated) DEVICE — SUT MNCRYL 4/0 SH 27IN Y415H